# Patient Record
Sex: FEMALE | Race: WHITE | NOT HISPANIC OR LATINO | Employment: UNEMPLOYED | URBAN - METROPOLITAN AREA
[De-identification: names, ages, dates, MRNs, and addresses within clinical notes are randomized per-mention and may not be internally consistent; named-entity substitution may affect disease eponyms.]

---

## 2021-09-15 ENCOUNTER — OFFICE VISIT (OUTPATIENT)
Dept: INTERNAL MEDICINE | Facility: CLINIC | Age: 26
End: 2021-09-15
Payer: COMMERCIAL

## 2021-09-15 VITALS
DIASTOLIC BLOOD PRESSURE: 65 MMHG | HEIGHT: 66 IN | HEART RATE: 94 BPM | SYSTOLIC BLOOD PRESSURE: 101 MMHG | TEMPERATURE: 98 F | OXYGEN SATURATION: 99 % | BODY MASS INDEX: 18.33 KG/M2 | WEIGHT: 114.06 LBS

## 2021-09-15 DIAGNOSIS — F41.9 ANXIETY: Primary | ICD-10-CM

## 2021-09-15 PROCEDURE — 99999 PR PBB SHADOW E&M-NEW PATIENT-LVL III: ICD-10-PCS | Mod: PBBFAC,,, | Performed by: FAMILY MEDICINE

## 2021-09-15 PROCEDURE — 99203 PR OFFICE/OUTPT VISIT, NEW, LEVL III, 30-44 MIN: ICD-10-PCS | Mod: S$GLB,,, | Performed by: FAMILY MEDICINE

## 2021-09-15 PROCEDURE — 99203 OFFICE O/P NEW LOW 30 MIN: CPT | Mod: S$GLB,,, | Performed by: FAMILY MEDICINE

## 2021-09-15 PROCEDURE — 99999 PR PBB SHADOW E&M-NEW PATIENT-LVL III: CPT | Mod: PBBFAC,,, | Performed by: FAMILY MEDICINE

## 2021-09-15 RX ORDER — SPIRONOLACTONE 100 MG/1
100 TABLET, FILM COATED ORAL DAILY
COMMUNITY
End: 2023-08-29

## 2021-09-15 RX ORDER — CLINDAMYCIN PHOSPHATE AND TRETINOIN GEL 1.2%/0.025% 10; .25 MG/G; MG/G
GEL TOPICAL
COMMUNITY
Start: 2013-07-19 | End: 2022-09-29

## 2021-09-15 RX ORDER — LORAZEPAM 0.5 MG/1
0.5 TABLET ORAL EVERY 6 HOURS PRN
Qty: 21 TABLET | Refills: 0 | Status: SHIPPED | OUTPATIENT
Start: 2021-09-15 | End: 2021-10-06 | Stop reason: SDUPTHER

## 2021-09-15 RX ORDER — SULFACETAMIDE SODIUM AND SULFUR 9; 4.5 MG/G; MG/G
RINSE TOPICAL
COMMUNITY
Start: 2013-07-11 | End: 2023-08-29

## 2021-09-15 RX ORDER — ADAPALENE AND BENZOYL PEROXIDE GEL, 0.1%/2.5% 1; 25 MG/G; MG/G
GEL TOPICAL
COMMUNITY
Start: 2013-07-11 | End: 2022-09-29

## 2021-09-15 RX ORDER — ESCITALOPRAM OXALATE 10 MG/1
10 TABLET ORAL DAILY
COMMUNITY
End: 2021-10-06

## 2021-09-15 RX ORDER — METRONIDAZOLE 7.5 MG/G
GEL TOPICAL 2 TIMES DAILY
COMMUNITY
End: 2022-09-29

## 2021-09-16 ENCOUNTER — TELEPHONE (OUTPATIENT)
Dept: INTERNAL MEDICINE | Facility: CLINIC | Age: 26
End: 2021-09-16

## 2021-09-16 DIAGNOSIS — F41.9 ANXIETY: Primary | ICD-10-CM

## 2021-10-01 ENCOUNTER — PATIENT OUTREACH (OUTPATIENT)
Dept: ADMINISTRATIVE | Facility: HOSPITAL | Age: 26
End: 2021-10-01

## 2021-10-05 PROBLEM — F41.9 ANXIETY: Status: ACTIVE | Noted: 2021-10-05

## 2021-10-06 ENCOUNTER — OFFICE VISIT (OUTPATIENT)
Dept: INTERNAL MEDICINE | Facility: CLINIC | Age: 26
End: 2021-10-06
Payer: COMMERCIAL

## 2021-10-06 VITALS
TEMPERATURE: 96 F | HEIGHT: 66 IN | SYSTOLIC BLOOD PRESSURE: 102 MMHG | HEART RATE: 68 BPM | BODY MASS INDEX: 18.25 KG/M2 | DIASTOLIC BLOOD PRESSURE: 64 MMHG | OXYGEN SATURATION: 99 % | WEIGHT: 113.56 LBS

## 2021-10-06 DIAGNOSIS — F41.9 ANXIETY: ICD-10-CM

## 2021-10-06 PROCEDURE — 99213 PR OFFICE/OUTPT VISIT, EST, LEVL III, 20-29 MIN: ICD-10-PCS | Mod: S$GLB,,, | Performed by: FAMILY MEDICINE

## 2021-10-06 PROCEDURE — 99213 OFFICE O/P EST LOW 20 MIN: CPT | Mod: S$GLB,,, | Performed by: FAMILY MEDICINE

## 2021-10-06 PROCEDURE — 99999 PR PBB SHADOW E&M-EST. PATIENT-LVL III: CPT | Mod: PBBFAC,,, | Performed by: FAMILY MEDICINE

## 2021-10-06 PROCEDURE — 99999 PR PBB SHADOW E&M-EST. PATIENT-LVL III: ICD-10-PCS | Mod: PBBFAC,,, | Performed by: FAMILY MEDICINE

## 2021-10-06 RX ORDER — LORAZEPAM 0.5 MG/1
0.5 TABLET ORAL EVERY 6 HOURS PRN
Qty: 30 TABLET | Refills: 0 | Status: SHIPPED | OUTPATIENT
Start: 2021-10-06 | End: 2021-11-23 | Stop reason: SDUPTHER

## 2021-10-19 ENCOUNTER — TELEPHONE (OUTPATIENT)
Dept: INTERNAL MEDICINE | Facility: CLINIC | Age: 26
End: 2021-10-19

## 2021-11-23 RX ORDER — LORAZEPAM 0.5 MG/1
0.5 TABLET ORAL EVERY 6 HOURS PRN
Qty: 30 TABLET | Refills: 0 | Status: SHIPPED | OUTPATIENT
Start: 2021-11-23 | End: 2021-12-16 | Stop reason: SDUPTHER

## 2021-12-16 ENCOUNTER — OFFICE VISIT (OUTPATIENT)
Dept: INTERNAL MEDICINE | Facility: CLINIC | Age: 26
End: 2021-12-16
Payer: COMMERCIAL

## 2021-12-16 VITALS
WEIGHT: 117.06 LBS | DIASTOLIC BLOOD PRESSURE: 68 MMHG | HEIGHT: 66 IN | TEMPERATURE: 97 F | HEART RATE: 81 BPM | OXYGEN SATURATION: 97 % | BODY MASS INDEX: 18.81 KG/M2 | SYSTOLIC BLOOD PRESSURE: 100 MMHG

## 2021-12-16 DIAGNOSIS — F41.9 ANXIETY: Primary | ICD-10-CM

## 2021-12-16 PROCEDURE — 99999 PR PBB SHADOW E&M-EST. PATIENT-LVL III: CPT | Mod: PBBFAC,,, | Performed by: FAMILY MEDICINE

## 2021-12-16 PROCEDURE — 99999 PR PBB SHADOW E&M-EST. PATIENT-LVL III: ICD-10-PCS | Mod: PBBFAC,,, | Performed by: FAMILY MEDICINE

## 2021-12-16 PROCEDURE — 99213 OFFICE O/P EST LOW 20 MIN: CPT | Mod: S$GLB,,, | Performed by: FAMILY MEDICINE

## 2021-12-16 PROCEDURE — 99213 PR OFFICE/OUTPT VISIT, EST, LEVL III, 20-29 MIN: ICD-10-PCS | Mod: S$GLB,,, | Performed by: FAMILY MEDICINE

## 2021-12-16 RX ORDER — LORAZEPAM 0.5 MG/1
0.5 TABLET ORAL EVERY 6 HOURS PRN
Qty: 30 TABLET | Refills: 0 | Status: SHIPPED | OUTPATIENT
Start: 2021-12-16 | End: 2022-01-10 | Stop reason: SDUPTHER

## 2021-12-16 RX ORDER — VENLAFAXINE HYDROCHLORIDE 37.5 MG/1
37.5 CAPSULE, EXTENDED RELEASE ORAL DAILY
Qty: 30 CAPSULE | Refills: 11 | Status: SHIPPED | OUTPATIENT
Start: 2021-12-16 | End: 2022-02-02

## 2022-01-10 ENCOUNTER — PATIENT MESSAGE (OUTPATIENT)
Dept: INTERNAL MEDICINE | Facility: CLINIC | Age: 27
End: 2022-01-10
Payer: COMMERCIAL

## 2022-01-10 RX ORDER — LORAZEPAM 0.5 MG/1
0.5 TABLET ORAL EVERY 6 HOURS PRN
Qty: 30 TABLET | Refills: 0 | Status: SHIPPED | OUTPATIENT
Start: 2022-01-10 | End: 2022-02-09 | Stop reason: SDUPTHER

## 2022-01-10 NOTE — TELEPHONE ENCOUNTER
Care Due:                  Date            Visit Type   Department     Provider  --------------------------------------------------------------------------------                                             [facility]  Last Visit: 12-      [facility]       [facility]                              FOLLOWUP/OF  [facility]  Next Visit: 02-      [facility] VISIT   [facility]       Amanda Cruz                                                            Last  Test          Frequency    Reason                     Performed    Due Date  --------------------------------------------------------------------------------    Cr..........  12 months..  venlafaxine..............  Not Found    Overdue    Powered by [employer]. Reference number: [id number].   1/10/2022 12:05:16 PM CST

## 2022-01-10 NOTE — TELEPHONE ENCOUNTER
----- Message from Yordan Irving sent at 1/10/2022 11:59 AM CST -----  Contact: self  [phone]  Requesting an RX refill or new RX.  Is this a refill or new RX: refill  RX name and strength (copy/paste from chart): LORazepam (ATIVAN) 0.5 MG tablet  Is this a 30 day or 90 day RX:   Patient advised that in the future they can use their [facility] account to request a refill?:  self  Pharmacy name and phone # (copy/paste from chart):    [id number] - [place] - [address] [facility]  [address] [address]  Phone: [phone] Fax: [phone]      Comments:     Please call and advise

## 2022-01-12 ENCOUNTER — PATIENT MESSAGE (OUTPATIENT)
Dept: INTERNAL MEDICINE | Facility: CLINIC | Age: 27
End: 2022-01-12
Payer: COMMERCIAL

## 2022-01-13 ENCOUNTER — PATIENT MESSAGE (OUTPATIENT)
Dept: INTERNAL MEDICINE | Facility: CLINIC | Age: 27
End: 2022-01-13
Payer: COMMERCIAL

## 2022-01-19 ENCOUNTER — PATIENT OUTREACH (OUTPATIENT)
Dept: ADMINISTRATIVE | Facility: OTHER | Age: 27
End: 2022-01-19
Payer: COMMERCIAL

## 2022-01-20 ENCOUNTER — OFFICE VISIT (OUTPATIENT)
Dept: OTOLARYNGOLOGY | Facility: CLINIC | Age: 27
End: 2022-01-20
Payer: COMMERCIAL

## 2022-01-20 DIAGNOSIS — H93.8X1 EAR FULLNESS, RIGHT: Primary | ICD-10-CM

## 2022-01-20 PROCEDURE — 99203 PR OFFICE/OUTPT VISIT, NEW, LEVL III, 30-44 MIN: ICD-10-PCS | Mod: 95,,, | Performed by: PHYSICIAN ASSISTANT

## 2022-01-20 PROCEDURE — 99203 OFFICE O/P NEW LOW 30 MIN: CPT | Mod: 95,,, | Performed by: PHYSICIAN ASSISTANT

## 2022-01-20 NOTE — PROGRESS NOTES
Health Maintenance Due   Topic Date Due    Hepatitis C Screening  Never done    Lipid Panel  Never done    HPV Vaccines (1 - 2-dose series) Never done    HIV Screening  Never done    TETANUS VACCINE  Never done    Influenza Vaccine (1) Never done    COVID-19 Vaccine (3 - Booster for Pfizer series) 02/07/2022     Updates were requested from care everywhere.  Chart was reviewed for overdue [facility]active [facility] Encounters ([facility]ER[facility]) topics (CRS, Breast Cancer Screening, Eye exam)  Health Maintenance has been updated.  LINKS immunization registry triggered.  Immunizations were reconciled.

## 2022-01-20 NOTE — PROGRESS NOTES
"Subjective:   Patient ID: Lisa Patterson is a 26 y.o. female.    Chief Complaint: No chief complaint on file.    The patient location is: [place]  The chief complaint leading to consultation is:     Visit type: audiovisual    Face to Face time with patient: 25 minutes of total time spent on the encounter, which includes face to face time and non-face to face time preparing to see the patient (eg, review of tests), Obtaining and/or reviewing separately obtained history, Documenting clinical information in the electronic or other health record, Independently interpreting results (not separately reported) and communicating results to the patient/family/caregiver, or Care coordination (not separately reported).         Each patient to whom he or she provides medical services by telemedicine is:  (1) informed of the relationship between the physician and patient and the respective role of any other health care provider with respect to management of the patient; and (2) notified that he or she may decline to receive medical services by telemedicine and may withdraw from such care at any time.    Notes: 27 yo female I spoke to via telemed with complaints of right ear muffled hearing, "feels like water in my ear" She denies any drainage, fever, hearing loss or pain. She recently had Covid.     Review of patient's allergies indicates:  No Known Allergies        Review of Systems   Constitutional: Positive for fever.   HENT: Positive for ear pain.    Eyes: Positive for photophobia.   Respiratory: Positive for cough.    Cardiovascular: Negative.    Gastrointestinal: Negative.    Endocrine: Negative.    Genitourinary: Negative.    Musculoskeletal: Negative.    Skin: Negative.    Allergic/Immunologic: Negative.    Neurological: Positive for dizziness and headaches.   Hematological: Negative.    Psychiatric/Behavioral: Positive for decreased concentration. The patient is nervous/anxious.          Objective:   There were no vitals " taken for this visit.    Physical Exam  Constitutional:       Appearance: Normal appearance.   HENT:      Head: Normocephalic and atraumatic.   Eyes:      Extraocular Movements: Extraocular movements intact.      Pupils: Pupils are equal, round, and reactive to light.   Neurological:      Mental Status: She is alert.        LIMITED DUE TO TELEMEDICINE      Assessment:     1. Ear fullness, right        Plan:     Ear fullness, right      Discussed that is it hard to diagnose withour seeing the ear. Dicussed ETD vs effusion.     We had a long discussion regarding the anatomy and function of the eustachian tube.  We discussed that the eustachian tube acts as a pump to keep the appropriate amount of pressure behind the ear drum.  I gave the patient a prescription for a nasal steroid spray to be used on a daily basis, and we discussed that it will take 2-3 weeks of daily use to achieve maximal effectiveness.    If discomfort does not improve, she will make an appt to be seen in person.

## 2022-01-25 ENCOUNTER — PATIENT MESSAGE (OUTPATIENT)
Dept: INTERNAL MEDICINE | Facility: CLINIC | Age: 27
End: 2022-01-25
Payer: COMMERCIAL

## 2022-02-01 ENCOUNTER — OFFICE VISIT (OUTPATIENT)
Dept: OTOLARYNGOLOGY | Facility: CLINIC | Age: 27
End: 2022-02-01
Payer: COMMERCIAL

## 2022-02-01 VITALS
BODY MASS INDEX: 19.78 KG/M2 | DIASTOLIC BLOOD PRESSURE: 69 MMHG | HEART RATE: 99 BPM | SYSTOLIC BLOOD PRESSURE: 100 MMHG | TEMPERATURE: 98 F | WEIGHT: 122.56 LBS

## 2022-02-01 DIAGNOSIS — H65.91 OME (OTITIS MEDIA WITH EFFUSION), RIGHT: Primary | ICD-10-CM

## 2022-02-01 DIAGNOSIS — H60.311 ACUTE DIFFUSE OTITIS EXTERNA OF RIGHT EAR: ICD-10-CM

## 2022-02-01 PROCEDURE — 99214 PR OFFICE/OUTPT VISIT, EST, LEVL IV, 30-39 MIN: ICD-10-PCS | Mod: S$GLB,,, | Performed by: PHYSICIAN ASSISTANT

## 2022-02-01 PROCEDURE — 99999 PR PBB SHADOW E&M-EST. PATIENT-LVL III: CPT | Mod: PBBFAC,,, | Performed by: PHYSICIAN ASSISTANT

## 2022-02-01 PROCEDURE — 99999 PR PBB SHADOW E&M-EST. PATIENT-LVL III: ICD-10-PCS | Mod: PBBFAC,,, | Performed by: PHYSICIAN ASSISTANT

## 2022-02-01 PROCEDURE — 99214 OFFICE O/P EST MOD 30 MIN: CPT | Mod: S$GLB,,, | Performed by: PHYSICIAN ASSISTANT

## 2022-02-01 RX ORDER — AMOXICILLIN 875 MG/1
875 TABLET, FILM COATED ORAL 2 TIMES DAILY
Qty: 20 TABLET | Refills: 0 | Status: SHIPPED | OUTPATIENT
Start: 2022-02-01 | End: 2022-02-11

## 2022-02-01 RX ORDER — CIPROFLOXACIN AND DEXAMETHASONE 3; 1 MG/ML; MG/ML
4 SUSPENSION/ DROPS AURICULAR (OTIC) 2 TIMES DAILY
Qty: 7.5 ML | Refills: 0 | Status: SHIPPED | OUTPATIENT
Start: 2022-02-01 | End: 2022-02-11

## 2022-02-01 NOTE — PROGRESS NOTES
"Subjective:   Patient ID: Lisa Patterson is a 26 y.o. female.    Chief Complaint: No chief complaint on file.    Ms. Patterson is a very pleasant 27 yo female here to see me today for complaints of right ear pain and "hearing under water." I saw her virtually a few months ago with ETD and she has been suing Flonase regularly. She was initially doing better but this past week worsened. Denies any fever.     Review of patient's allergies indicates:  No Known Allergies        Review of Systems   Constitutional: Positive for appetite change and fever.   HENT: Positive for ear pain and hearing loss.    Eyes: Positive for photophobia.   Respiratory: Negative.    Cardiovascular: Negative.    Gastrointestinal: Negative.    Endocrine: Negative.    Genitourinary: Negative.    Musculoskeletal: Negative.    Skin: Negative.    Neurological: Positive for dizziness and headaches.   Hematological: Negative.    Psychiatric/Behavioral: Positive for decreased concentration.         Objective:   /69   Pulse 99   Temp 97.7 °F (36.5 °C) (Temporal)   Wt 55.6 kg (122 lb 9.2 oz)   BMI 19.78 kg/m²     Physical Exam  Constitutional:       General: She is not in acute distress.     Appearance: She is well-developed and well-nourished.   HENT:      Head: Normocephalic and atraumatic.      Right Ear: Ear canal and external ear normal. Swelling and tenderness present. A middle ear effusion (pus) is present.      Left Ear: Tympanic membrane, ear canal and external ear normal.      Nose: Nose normal. No nasal deformity, septal deviation, mucosal edema, rhinorrhea or epistaxis.      Right Sinus: No maxillary sinus tenderness or frontal sinus tenderness.      Left Sinus: No maxillary sinus tenderness or frontal sinus tenderness.      Mouth/Throat:      Mouth: Oropharynx is clear and moist and mucous membranes are normal. Mucous membranes are not pale and not dry.      Dentition: No dental caries.      Pharynx: Uvula midline. No oropharyngeal " exudate or posterior oropharyngeal erythema.   Eyes:      General: Lids are normal. No scleral icterus.     Extraocular Movements: EOM normal.      Right eye: Normal extraocular motion and no nystagmus.      Left eye: Normal extraocular motion and no nystagmus.      Conjunctiva/sclera: Conjunctivae normal.      Right eye: Right conjunctiva is not injected. No chemosis.     Left eye: Left conjunctiva is not injected. No chemosis.     Pupils: Pupils are equal, round, and reactive to light.   Neck:      Thyroid: No thyroid mass or thyromegaly.      Trachea: Trachea and phonation normal. No tracheal tenderness or tracheal deviation.   Cardiovascular:      Pulses: Intact distal pulses.   Pulmonary:      Effort: Pulmonary effort is normal. No respiratory distress.      Breath sounds: No stridor.   Abdominal:      General: There is no distension.   Lymphadenopathy:      Head:      Right side of head: No submental, submandibular, preauricular, posterior auricular or occipital adenopathy.      Left side of head: No submental, submandibular, preauricular, posterior auricular or occipital adenopathy.      Cervical: No cervical adenopathy.   Skin:     General: Skin is warm and dry.      Findings: No erythema or rash.   Neurological:      Mental Status: She is alert and oriented to person, place, and time.      Cranial Nerves: No cranial nerve deficit.   Psychiatric:         Mood and Affect: Mood and affect normal.         Behavior: Behavior normal.              Assessment:     1. OME (otitis media with effusion), right    2. Acute diffuse otitis externa of right ear        Plan:     OME (otitis media with effusion), right    Acute diffuse otitis externa of right ear    Other orders  -     amoxicillin (AMOXIL) 875 MG tablet; Take 1 tablet (875 mg total) by mouth 2 (two) times daily. for 10 days  Dispense: 20 tablet; Refill: 0  -     ciprofloxacin-dexamethasone 0.3-0.1% (CIPRODEX) 0.3-0.1 % DrpS; Place 4 drops into both ears 2  (two) times daily. for 10 days  Dispense: 7.5 mL; Refill: 0      I have started her on both oral antibiotic as well as topical. I will see her back in 2 weeks for recheck or sooner if needed. She will continue her Flonase twice a day.

## 2022-02-02 ENCOUNTER — OFFICE VISIT (OUTPATIENT)
Dept: INTERNAL MEDICINE | Facility: CLINIC | Age: 27
End: 2022-02-02
Payer: COMMERCIAL

## 2022-02-02 VITALS
DIASTOLIC BLOOD PRESSURE: 68 MMHG | WEIGHT: 119.94 LBS | HEIGHT: 66 IN | BODY MASS INDEX: 19.27 KG/M2 | OXYGEN SATURATION: 97 % | TEMPERATURE: 97 F | SYSTOLIC BLOOD PRESSURE: 108 MMHG | HEART RATE: 106 BPM | RESPIRATION RATE: 18 BRPM

## 2022-02-02 DIAGNOSIS — F41.9 ANXIETY: Primary | ICD-10-CM

## 2022-02-02 PROCEDURE — 99999 PR PBB SHADOW E&M-EST. PATIENT-LVL III: CPT | Mod: PBBFAC,,, | Performed by: FAMILY MEDICINE

## 2022-02-02 PROCEDURE — 99213 PR OFFICE/OUTPT VISIT, EST, LEVL III, 20-29 MIN: ICD-10-PCS | Mod: S$GLB,,, | Performed by: FAMILY MEDICINE

## 2022-02-02 PROCEDURE — 99999 PR PBB SHADOW E&M-EST. PATIENT-LVL III: ICD-10-PCS | Mod: PBBFAC,,, | Performed by: FAMILY MEDICINE

## 2022-02-02 PROCEDURE — 99213 OFFICE O/P EST LOW 20 MIN: CPT | Mod: S$GLB,,, | Performed by: FAMILY MEDICINE

## 2022-02-02 NOTE — PROGRESS NOTES
Lisa Patterson  02/02/2022  [id number]    Amanda Cruz MD  Patient Care Team:  Amanda Cruz MD as PCP - General (Family Medicine)  Jo Michelle MD as Obstetrician (Obstetrics and Gynecology)  Has the patient seen any provider outside of the [facility] network since the last visit? (no). If yes, HIPPA forms completed and records requested.        Visit Type:a scheduled routine follow-up visit    Chief Complaint:  Chief Complaint   Patient presents with    Follow-up       History of Present Illness:  26 year old here for follow up.    Anxiety  Did not tolerate the Effexor  Felt dizziness.    She has been getting frustrated with medication.    She wants to solve when she is feeling super sad.    She is taking Ativan now once a day.  She reports she is still feeling depression.    She is seeing therapist.     She has had issues with taking Lexapro and the Effexor.               History:  Past Medical History:   Diagnosis Date    Anorexia     Anxiety      No past surgical history on file.  Family History   Problem Relation Age of Onset    Thyroid disease Mother     Breast cancer Maternal Grandfather      Social History     Socioeconomic History    Marital status: Single   Tobacco Use    Smoking status: Never Smoker    Smokeless tobacco: Never Used   Substance and Sexual Activity    Alcohol use: Not Currently    Drug use: Never    Sexual activity: Not Currently     Patient Active Problem List   Diagnosis    Anxiety     Review of patient's allergies indicates:  No Known Allergies    The following were reviewed at this visit: active problem list, medication list, allergies, family history, social history, and health maintenance.    Medications:  Current Outpatient Medications on File Prior to Visit   Medication Sig Dispense Refill    amoxicillin (AMOXIL) 875 MG tablet Take 1 tablet (875 mg total) by mouth 2 (two) times daily. for 10 days 20 tablet 0    ciprofloxacin-dexamethasone 0.3-0.1% (CIPRODEX)  0.3-0.1 % DrpS Place 4 drops into both ears 2 (two) times daily. for 10 days 7.5 mL 0    LORazepam (ATIVAN) 0.5 MG tablet Take 1 tablet (0.5 mg total) by mouth every 6 (six) hours as needed for Anxiety. 30 tablet 0    metroNIDAZOLE (METROGEL) 0.75 % gel Apply topically 2 (two) times daily.      spironolactone (ALDACTONE) 100 MG tablet Take 100 mg by mouth once daily.      sulfacetamide sodium-sulfur 9-4.5 % Clsr       adapalene-benzoyl peroxide (EPIDUO) 0.1-2.5 % GlwP       clindamycin-tretinoin (ZIANA) gel       [DISCONTINUED] venlafaxine (EFFEXOR-XR) 37.5 MG 24 hr capsule Take 1 capsule (37.5 mg total) by mouth once daily. (Patient not taking: Reported on 2/2/2022) 30 capsule 11     No current facility-administered medications on file prior to visit.       Medications have been reviewed and reconciled with patient at this visit.  Barriers to medications reviewed with patient.    Adverse reactions to current medications reviewed with patient..    Over the counter medications reviewed and reconciled with patient.    Exam:  Wt Readings from Last 3 Encounters:   02/02/22 54.4 kg (119 lb 14.9 oz)   02/01/22 55.6 kg (122 lb 9.2 oz)   12/16/21 53.1 kg (117 lb 1 oz)     Temp Readings from Last 3 Encounters:   02/02/22 97.2 °F (36.2 °C) (Tympanic)   02/01/22 97.7 °F (36.5 °C) (Temporal)   12/16/21 97 °F (36.1 °C) (Tympanic)     BP Readings from Last 3 Encounters:   02/02/22 108/68   02/01/22 100/69   12/16/21 100/68     Pulse Readings from Last 3 Encounters:   02/02/22 106   02/01/22 99   12/16/21 81     Body mass index is 19.36 kg/m².      Review of Systems   HENT: Positive for hearing loss.    Eyes: Negative for discharge.   Respiratory: Negative for wheezing.    Cardiovascular: Negative for chest pain and palpitations.   Gastrointestinal: Negative for blood in stool, constipation, diarrhea and vomiting.   Genitourinary: Negative for dysuria and hematuria.   Musculoskeletal: Positive for neck pain.   Neurological:  Positive for headaches.   Endo/Heme/Allergies: Negative for polydipsia.   Psychiatric/Behavioral: The patient is nervous/anxious.    Answers for HPI/ROS submitted by the patient on 2/2/2022  activity change: Yes  unexpected weight change: No  rhinorrhea: No  trouble swallowing: No  visual disturbance: No  chest tightness: No  polyuria: No  difficulty urinating: No  menstrual problem: No  joint swelling: No  arthralgias: No  confusion: Yes  dysphoric mood: Yes      Physical Exam  Vitals and nursing note reviewed.   Constitutional:       General: She is not in acute distress.     Appearance: She is well-developed. She is not diaphoretic.   HENT:      Head: Normocephalic and atraumatic.      Right Ear: External ear normal.      Left Ear: External ear normal.      Nose: Nose normal.   Eyes:      General:         Right eye: No discharge.         Left eye: No discharge.      Conjunctiva/sclera: Conjunctivae normal.      Pupils: Pupils are equal, round, and reactive to light.   Neck:      Thyroid: No thyromegaly.   Cardiovascular:      Rate and Rhythm: Normal rate and regular rhythm.      Heart sounds: Normal heart sounds. No murmur heard.      Pulmonary:      Effort: Pulmonary effort is normal. No respiratory distress.      Breath sounds: Normal breath sounds. No wheezing.   Musculoskeletal:         General: Normal range of motion.      Cervical back: Normal range of motion and neck supple.   Lymphadenopathy:      Cervical: No cervical adenopathy.   Skin:     Capillary Refill: Capillary refill takes less than 2 seconds.   Neurological:      Mental Status: She is alert and oriented to person, place, and time.      Cranial Nerves: No cranial nerve deficit.   Psychiatric:         Behavior: Behavior normal.         Thought Content: Thought content normal.         Judgment: Judgment normal.         Laboratory Reviewed ({N/A)  No results found for: WBC, HGB, HCT, PLT, CHOL, TRIG, HDL, LDLDIRECT, ALT, AST, NA, K, CL, CREATININE,  BUN, CO2, TSH, PSA, INR, GLUF, HGBA1C, MICROALBUR    Lisa was seen today for follow-up.    Diagnoses and all orders for this visit:    Anxiety  -     Ambulatory referral/consult to Psychiatry; Future      She is going to see Psych  She is going to continue prn ativan    Needs to discuss mood stablizer.   She will continue with therapy.    She also will discuss her focus.            Care Plan/Goals: Reviewed   Goals    None         Follow up: No follow-ups on file.    After visit summary was printed and given to patient upon discharge today.  Patient goals and care plan are included in After Visit Summary.

## 2022-02-09 ENCOUNTER — PATIENT MESSAGE (OUTPATIENT)
Dept: INTERNAL MEDICINE | Facility: CLINIC | Age: 27
End: 2022-02-09
Payer: COMMERCIAL

## 2022-02-09 RX ORDER — LORAZEPAM 0.5 MG/1
0.5 TABLET ORAL DAILY PRN
Qty: 30 TABLET | Refills: 0 | Status: SHIPPED | OUTPATIENT
Start: 2022-02-09 | End: 2022-03-11 | Stop reason: SDUPTHER

## 2022-02-16 ENCOUNTER — TELEPHONE (OUTPATIENT)
Dept: OTOLARYNGOLOGY | Facility: CLINIC | Age: 27
End: 2022-02-16
Payer: COMMERCIAL

## 2022-02-18 ENCOUNTER — TELEPHONE (OUTPATIENT)
Dept: OTOLARYNGOLOGY | Facility: CLINIC | Age: 27
End: 2022-02-18
Payer: COMMERCIAL

## 2022-02-21 ENCOUNTER — PATIENT OUTREACH (OUTPATIENT)
Dept: ADMINISTRATIVE | Facility: OTHER | Age: 27
End: 2022-02-21
Payer: COMMERCIAL

## 2022-02-22 NOTE — PROGRESS NOTES
Health Maintenance Due   Topic Date Due    Hepatitis C Screening  Never done    Lipid Panel  Never done    HPV Vaccines (1 - 2-dose series) Never done    HIV Screening  Never done    TETANUS VACCINE  Never done    Influenza Vaccine (1) Never done    COVID-19 Vaccine (3 - Booster for Pfizer series) 01/07/2022     Updates were requested from care everywhere.  Chart was reviewed for overdue [facility] Encounters ([facility]) topics (CRS, Breast Cancer Screening, Eye exam)  Health Maintenance has been updated.  LINKS immunization registry triggered.  Immunizations were reconciled.

## 2022-03-09 ENCOUNTER — TELEPHONE (OUTPATIENT)
Dept: PSYCHIATRY | Facility: CLINIC | Age: 27
End: 2022-03-09
Payer: COMMERCIAL

## 2022-03-10 ENCOUNTER — PATIENT MESSAGE (OUTPATIENT)
Dept: INTERNAL MEDICINE | Facility: CLINIC | Age: 27
End: 2022-03-10
Payer: COMMERCIAL
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
## 2022-03-11 ENCOUNTER — OFFICE VISIT (OUTPATIENT)
Dept: PSYCHIATRY | Facility: CLINIC | Age: 27
End: 2022-03-11
Payer: COMMERCIAL

## 2022-03-11 DIAGNOSIS — F41.1 GAD (GENERALIZED ANXIETY DISORDER): ICD-10-CM

## 2022-03-11 PROCEDURE — 90792 PSYCH DIAG EVAL W/MED SRVCS: CPT | Mod: S$GLB,,, | Performed by: PSYCHIATRY & NEUROLOGY

## 2022-03-11 PROCEDURE — 99999 PR PBB SHADOW E&M-EST. PATIENT-LVL II: ICD-10-PCS | Mod: PBBFAC,,, | Performed by: PSYCHIATRY & NEUROLOGY

## 2022-03-11 PROCEDURE — 99999 PR PBB SHADOW E&M-EST. PATIENT-LVL II: CPT | Mod: PBBFAC,,, | Performed by: PSYCHIATRY & NEUROLOGY

## 2022-03-11 PROCEDURE — 90792 PR PSYCHIATRIC DIAGNOSTIC EVALUATION W/MEDICAL SERVICES: ICD-10-PCS | Mod: S$GLB,,, | Performed by: PSYCHIATRY & NEUROLOGY

## 2022-03-11 RX ORDER — LORAZEPAM 0.5 MG/1
0.5 TABLET ORAL DAILY PRN
Qty: 30 TABLET | Refills: 0 | Status: SHIPPED | OUTPATIENT
Start: 2022-03-11 | End: 2022-03-11 | Stop reason: SDUPTHER

## 2022-03-11 RX ORDER — SERTRALINE HYDROCHLORIDE 100 MG/1
TABLET, FILM COATED ORAL
Qty: 30 TABLET | Refills: 2 | Status: SHIPPED | OUTPATIENT
Start: 2022-03-11 | End: 2022-04-22

## 2022-03-11 RX ORDER — LORAZEPAM 0.5 MG/1
0.5 TABLET ORAL DAILY PRN
Qty: 30 TABLET | Refills: 2 | Status: SHIPPED | OUTPATIENT
Start: 2022-03-11 | End: 2022-04-22 | Stop reason: SDUPTHER

## 2022-03-11 NOTE — TELEPHONE ENCOUNTER
No new care gaps identified.  Powered by [employer]. Reference number: [id number].   3/11/2022 7:52:13 AM CST

## 2022-03-25 ENCOUNTER — PATIENT OUTREACH (OUTPATIENT)
Dept: ADMINISTRATIVE | Facility: OTHER | Age: 27
End: 2022-03-25
Payer: COMMERCIAL

## 2022-03-28 ENCOUNTER — OFFICE VISIT (OUTPATIENT)
Dept: OBSTETRICS AND GYNECOLOGY | Facility: CLINIC | Age: 27
End: 2022-03-28
Payer: COMMERCIAL

## 2022-03-28 VITALS
SYSTOLIC BLOOD PRESSURE: 120 MMHG | WEIGHT: 117.31 LBS | BODY MASS INDEX: 18.93 KG/M2 | DIASTOLIC BLOOD PRESSURE: 68 MMHG

## 2022-03-28 DIAGNOSIS — Z12.4 ENCOUNTER FOR SCREENING FOR CERVICAL CANCER: ICD-10-CM

## 2022-03-28 DIAGNOSIS — Z30.09 ENCOUNTER FOR COUNSELING REGARDING CONTRACEPTION: ICD-10-CM

## 2022-03-28 DIAGNOSIS — Z01.419 WELL WOMAN EXAM WITH ROUTINE GYNECOLOGICAL EXAM: Primary | ICD-10-CM

## 2022-03-28 PROCEDURE — 99385 PREV VISIT NEW AGE 18-39: CPT | Mod: S$GLB,,, | Performed by: NURSE PRACTITIONER

## 2022-03-28 PROCEDURE — 99999 PR PBB SHADOW E&M-EST. PATIENT-LVL III: ICD-10-PCS | Mod: PBBFAC,,, | Performed by: NURSE PRACTITIONER

## 2022-03-28 PROCEDURE — 88175 CYTOPATH C/V AUTO FLUID REDO: CPT | Performed by: NURSE PRACTITIONER

## 2022-03-28 PROCEDURE — 99999 PR PBB SHADOW E&M-EST. PATIENT-LVL III: CPT | Mod: PBBFAC,,, | Performed by: NURSE PRACTITIONER

## 2022-03-28 PROCEDURE — 99385 PR PREVENTIVE VISIT,NEW,18-39: ICD-10-PCS | Mod: S$GLB,,, | Performed by: NURSE PRACTITIONER

## 2022-03-28 RX ORDER — METRONIDAZOLE 7.5 MG/G
LOTION TOPICAL 2 TIMES DAILY
COMMUNITY
Start: 2022-02-04

## 2022-03-28 RX ORDER — DOXYCYCLINE 100 MG/1
100 CAPSULE ORAL DAILY
COMMUNITY
Start: 2022-03-22 | End: 2022-09-29

## 2022-03-28 NOTE — PROGRESS NOTES
Subjective:       Patient ID: Lisa Patterson is a 26 y.o. female.    Chief Complaint:  Well Woman and Contraception    Patient's last menstrual period was 2022.  History of Present Illness  Annual Exam-Premenopausal  Patient presents for annual exam. The patient has no complaints today. The patient is not currently sexually active. GYN screening history: last pap: approximate date 21 and was normal. The patient wears seatbelts: yes. The patient participates in regular exercise: yes. Has the patient ever been transfused or tattooed?: no. The patient reports that there is not domestic violence in her life.  Patient also requesting contraception counseling today.  Patient has previously tried multiple methods of birth control with adverse effects bleeding to discontinuation.  Patient continues to struggle with hormonal acne as well as abnormal facial hair growth.  Dermatology advised patient that she will need to be on birth control for other treatment options.    OB History    Para Term  AB Living   0 0 0 0 0 0   SAB IAB Ectopic Multiple Live Births   0 0 0 0 0       Review of Systems  Review of Systems   Constitutional: Negative for appetite change, fatigue, fever and unexpected weight change.   Eyes: Negative for visual disturbance.   Cardiovascular: Negative for chest pain.   Gastrointestinal: Negative for abdominal pain, bloating, constipation, diarrhea, nausea and vomiting.   Genitourinary: Negative for bladder incontinence, dysmenorrhea, dyspareunia, dysuria, flank pain, frequency, genital sores, menorrhagia, menstrual problem, pelvic pain, urgency, vaginal bleeding, vaginal discharge, vaginal pain, postcoital bleeding, vaginal dryness and vaginal odor.   Integumentary:  Positive for acne. Negative for rash, mole/lesion, breast mass, nipple discharge, breast skin changes and breast tenderness.        Hirsutism   Neurological: Negative for syncope and headaches.   Hematological: Negative  for adenopathy. Does not bruise/bleed easily.   All other systems reviewed and are negative.  Breast: Positive for breast self exam.Negative for asymmetry, lump, mass, nipple discharge, skin changes and tenderness           Objective:      Physical Exam:   Constitutional: She is oriented to person, place, and time. She appears well-developed and well-nourished.    HENT:   Head: Normocephalic and atraumatic.    Eyes: Pupils are equal, round, and reactive to light. Conjunctivae and EOM are normal.     Cardiovascular: Normal rate and regular rhythm.     Pulmonary/Chest: Effort normal. Right breast exhibits no inverted nipple, no mass, no nipple discharge, no skin change, no tenderness, no bleeding and no swelling. Left breast exhibits no inverted nipple, no mass, no nipple discharge, no skin change, no tenderness, no bleeding and no swelling. Breasts are symmetrical.        Abdominal: Soft. Hernia confirmed negative in the right inguinal area and confirmed negative in the left inguinal area.     Genitourinary:    Inguinal canal, vagina, uterus, right adnexa, left adnexa and rectum normal.      Pelvic exam was performed with patient supine.   The external female genitalia was normal.   Genitalia hair distrobution normal .   Labial bartholins normal.There is no rash, tenderness, lesion or injury on the right labia. There is no rash, tenderness, lesion or injury on the left labia. Cervix is normal. No erythema,  no vaginal discharge, bleeding, rectocele, cystocele or unspecified prolapse of vaginal walls in the vagina. Cervix exhibits no motion tenderness and no friability. Uterus is not tender.           Musculoskeletal: Normal range of motion and moves all extremeties.      Lymphadenopathy: No inguinal adenopathy noted on the right or left side.    Neurological: She is alert and oriented to person, place, and time.    Skin: Skin is warm and dry. No rash noted. No erythema.    Psychiatric: She has a normal mood and  affect. Her behavior is normal. Judgment and thought content normal.            Assessment:     1. Well woman exam with routine gynecological exam    2. Encounter for screening for cervical cancer            Plan:   Lisa was seen today for well woman and contraception.    Diagnoses and all orders for this visit:    Well woman exam with routine gynecological exam  -     Liquid-Based Pap Smear, Screening    Encounter for screening for cervical cancer  -     Liquid-Based Pap Smear, Screening      Discussed contraception options with patient including pills, patch, ring, Depo Provera, Nexplanon and IUDs. Patient desires to consider options and will let me know.     Follow up with me in 1 year for annual well woman exam.

## 2022-04-05 LAB
FINAL PATHOLOGIC DIAGNOSIS: NORMAL
Lab: NORMAL

## 2022-04-20 ENCOUNTER — TELEPHONE (OUTPATIENT)
Dept: PSYCHIATRY | Facility: CLINIC | Age: 27
End: 2022-04-20
Payer: COMMERCIAL

## 2022-04-22 ENCOUNTER — OFFICE VISIT (OUTPATIENT)
Dept: PSYCHIATRY | Facility: CLINIC | Age: 27
End: 2022-04-22
Payer: COMMERCIAL

## 2022-04-22 DIAGNOSIS — G47.00 INSOMNIA, UNSPECIFIED TYPE: ICD-10-CM

## 2022-04-22 DIAGNOSIS — F41.1 GAD (GENERALIZED ANXIETY DISORDER): Primary | ICD-10-CM

## 2022-04-22 PROCEDURE — 99214 OFFICE O/P EST MOD 30 MIN: CPT | Mod: 95,,, | Performed by: PSYCHIATRY & NEUROLOGY

## 2022-04-22 PROCEDURE — 99214 PR OFFICE/OUTPT VISIT, EST, LEVL IV, 30-39 MIN: ICD-10-PCS | Mod: 95,,, | Performed by: PSYCHIATRY & NEUROLOGY

## 2022-04-22 RX ORDER — SERTRALINE HYDROCHLORIDE 50 MG/1
75 TABLET, FILM COATED ORAL DAILY
Qty: 45 TABLET | Refills: 2 | Status: SHIPPED | OUTPATIENT
Start: 2022-04-22 | End: 2022-06-20 | Stop reason: SDUPTHER

## 2022-04-22 RX ORDER — LORAZEPAM 0.5 MG/1
0.5 TABLET ORAL DAILY PRN
Qty: 30 TABLET | Refills: 2 | Status: SHIPPED | OUTPATIENT
Start: 2022-04-22 | End: 2022-08-04
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
## 2022-05-09 ENCOUNTER — PATIENT OUTREACH (OUTPATIENT)
Dept: ADMINISTRATIVE | Facility: OTHER | Age: 27
End: 2022-05-09
Payer: COMMERCIAL

## 2022-05-09 ENCOUNTER — OFFICE VISIT (OUTPATIENT)
Dept: DERMATOLOGY | Facility: CLINIC | Age: 27
End: 2022-05-09
Payer: COMMERCIAL

## 2022-05-09 ENCOUNTER — PATIENT MESSAGE (OUTPATIENT)
Dept: DERMATOLOGY | Facility: CLINIC | Age: 27
End: 2022-05-09

## 2022-05-09 DIAGNOSIS — L70.0 ACNE VULGARIS: Primary | ICD-10-CM

## 2022-05-09 PROCEDURE — 99203 OFFICE O/P NEW LOW 30 MIN: CPT | Mod: 95,,, | Performed by: PHYSICIAN ASSISTANT

## 2022-05-09 PROCEDURE — 99203 PR OFFICE/OUTPT VISIT, NEW, LEVL III, 30-44 MIN: ICD-10-PCS | Mod: 95,,, | Performed by: PHYSICIAN ASSISTANT

## 2022-05-09 RX ORDER — AZELAIC ACID 0.15 G/G
AEROSOL, FOAM TOPICAL
Qty: 50 G | Refills: 5 | Status: SHIPPED | OUTPATIENT
Start: 2022-05-09 | End: 2023-08-29

## 2022-05-09 NOTE — PROGRESS NOTES
Subjective:       Patient ID:  Lisa Patterson is a 26 y.o. female who presents for No chief complaint on file.    The patient location is: [place], [place]  The chief complaint leading to consultation is: acne    Visit type: audiovisual    Face to Face time with patient: 18 minutes  21 minutes of total time spent on the encounter, which includes face to face time and non-face to face time preparing to see the patient (eg, review of tests), Obtaining and/or reviewing separately obtained history, Documenting clinical information in the electronic or other health record, Independently interpreting results (not separately reported) and communicating results to the patient/family/caregiver, or Care coordination (not separately reported).         Each patient to whom he or she provides medical services by telemedicine is:  (1) informed of the relationship between the physician and patient and the respective role of any other health care provider with respect to management of the patient; and (2) notified that he or she may decline to receive medical services by telemedicine and may withdraw from such care at any time.    Notes:   C/o acne, admits to h/o acne as teen that got better, but had ultimately worsened with IUDs (no longer using- removed about 2 months ago). Some improvement with cessation of IUD, but still having frequent flares. Was seen by outside derm, but wants a 2nd opinion. Flares daily to every 2 days. Location: face. +Red tender pimples, discoloration. Hx of irregular menses. Denies flushing or sensitive skin.     Current tx: metro lotion 0.75% BID, azelaic acid 15% gel qd, altreeno 0.05% qhs (denies irritation), Cera Ve acne bp cleanser BID, Sodium Sulfacetamide 4.5 % cleanser qAM.     Previous tx: epiduo, finacea foam (liked); doxy (decreased appetite); spironolactone (weight loss)      Review of Systems     Objective:    Physical Exam   Constitutional: She appears well-developed and well-nourished. No  distress.   Neurological: She is alert and oriented to person, place, and time. She is not disoriented.   Psychiatric: She has a normal mood and affect.   Skin:   Areas Examined (abnormalities noted in diagram):   Head / Face Inspection Performed  Neck Inspection Performed  Chest / Axilla Inspection Performed  Back Inspection Performed  RUE Inspected  LUE Inspection Performed              Diagram Legend     Erythematous scaling macule/papule c/w actinic keratosis       Vascular papule c/w angioma      Pigmented verrucoid papule/plaque c/w seborrheic keratosis      Yellow umbilicated papule c/w sebaceous hyperplasia      Irregularly shaped tan macule c/w lentigo     1-2 mm smooth white papules consistent with Milia      Movable subcutaneous cyst with punctum c/w epidermal inclusion cyst      Subcutaneous movable cyst c/w pilar cyst      Firm pink to brown papule c/w dermatofibroma      Pedunculated fleshy papule(s) c/w skin tag(s)      Evenly pigmented macule c/w junctional nevus     Mildly variegated pigmented, slightly irregular-bordered macule c/w mildly atypical nevus      Flesh colored to evenly pigmented papule c/w intradermal nevus       Pink pearly papule/plaque c/w basal cell carcinoma      Erythematous hyperkeratotic cursted plaque c/w SCC      Surgical scar with no sign of skin cancer recurrence      Open and closed comedones      Inflammatory papules and pustules      Verrucoid papule consistent consistent with wart     Erythematous eczematous patches and plaques     Dystrophic onycholytic nail with subungual debris c/w onychomycosis     Umbilicated papule    Erythematous-base heme-crusted tan verrucoid plaque consistent with inflamed seborrheic keratosis     Erythematous Silvery Scaling Plaque c/w Psoriasis     See annotation      Assessment / Plan:        Acne vulgaris  -     azelaic acid (FINACEA) 15 % Foam; AAA thin layer to face qd as tolerated. May increase to bid if no irritation.  Dispense: 50 g;  Refill: 5  Failed oral doxy and spironolactone. Briefly discussed accutane, but she is not interested at this time. Agree with continuation of current topical regimen, but will try for finacea foam as alternative to the gel version. May continue metro lotion bid, altreeno qhs as tolerated. Would reconsider clinda gel as possible field or spot tx in future. Flares may continue to improve off the IUD. May reconsider oral OCP w/gyn as desired.            Follow up in about 3 months (around 8/9/2022).

## 2022-06-16 ENCOUNTER — TELEPHONE (OUTPATIENT)
Dept: PSYCHIATRY | Facility: CLINIC | Age: 27
End: 2022-06-16
Payer: COMMERCIAL

## 2022-06-20 ENCOUNTER — PATIENT MESSAGE (OUTPATIENT)
Dept: PSYCHIATRY | Facility: CLINIC | Age: 27
End: 2022-06-20
Payer: COMMERCIAL

## 2022-06-20 ENCOUNTER — OFFICE VISIT (OUTPATIENT)
Dept: PSYCHIATRY | Facility: CLINIC | Age: 27
End: 2022-06-20
Payer: COMMERCIAL

## 2022-06-20 ENCOUNTER — PATIENT MESSAGE (OUTPATIENT)
Dept: PSYCHIATRY | Facility: CLINIC | Age: 27
End: 2022-06-20

## 2022-06-20 DIAGNOSIS — Z03.89 NO DIAGNOSIS ON AXIS I: Primary | ICD-10-CM

## 2022-06-20 PROCEDURE — 99499 UNLISTED E&M SERVICE: CPT | Mod: 95,,, | Performed by: PSYCHIATRY & NEUROLOGY

## 2022-06-20 PROCEDURE — 99499 NO LOS: ICD-10-PCS | Mod: 95,,, | Performed by: PSYCHIATRY & NEUROLOGY

## 2022-06-20 RX ORDER — SERTRALINE HYDROCHLORIDE 50 MG/1
75 TABLET, FILM COATED ORAL DAILY
Qty: 45 TABLET | Refills: 2 | Status: SHIPPED | OUTPATIENT
Start: 2022-06-20 | End: 2022-11-17 | Stop reason: SDUPTHER

## 2022-08-03 ENCOUNTER — PATIENT MESSAGE (OUTPATIENT)
Dept: PSYCHIATRY | Facility: CLINIC | Age: 27
End: 2022-08-03
Payer: COMMERCIAL

## 2022-08-04 RX ORDER — LORAZEPAM 1 MG/1
TABLET ORAL
Qty: 60 TABLET | Refills: 2 | Status: SHIPPED | OUTPATIENT
Start: 2022-08-04 | End: 2022-11-17 | Stop reason: SDUPTHER

## 2022-08-25 ENCOUNTER — TELEPHONE (OUTPATIENT)
Dept: PSYCHIATRY | Facility: CLINIC | Age: 27
End: 2022-08-25
Payer: COMMERCIAL

## 2022-09-29 ENCOUNTER — OFFICE VISIT (OUTPATIENT)
Dept: INTERNAL MEDICINE | Facility: CLINIC | Age: 27
End: 2022-09-29
Payer: COMMERCIAL

## 2022-09-29 DIAGNOSIS — M79.10 MYALGIA: Primary | ICD-10-CM

## 2022-09-29 DIAGNOSIS — M54.9 BACK PAIN, UNSPECIFIED BACK LOCATION, UNSPECIFIED BACK PAIN LATERALITY, UNSPECIFIED CHRONICITY: ICD-10-CM

## 2022-09-29 PROCEDURE — 99213 PR OFFICE/OUTPT VISIT, EST, LEVL III, 20-29 MIN: ICD-10-PCS | Mod: 95,,, | Performed by: NURSE PRACTITIONER

## 2022-09-29 PROCEDURE — 99213 OFFICE O/P EST LOW 20 MIN: CPT | Mod: 95,,, | Performed by: NURSE PRACTITIONER

## 2022-09-29 RX ORDER — NAPROXEN 500 MG/1
500 TABLET ORAL 2 TIMES DAILY PRN
Qty: 30 TABLET | Refills: 0 | Status: SHIPPED | OUTPATIENT
Start: 2022-09-29 | End: 2023-08-29

## 2022-09-29 NOTE — ASSESSMENT & PLAN NOTE
R/o viral etiology with possibly subjective fever last night and exposure to covid in the past week.   If no improvement, needs to follow up in office with PCP.

## 2022-09-29 NOTE — PROGRESS NOTES
Subjective:       Patient ID: Lisa Patterson is a 27 y.o. female.    Chief Complaint: No chief complaint on file.    The patient location is: [place]  The chief complaint leading to consultation is: Myalgias, back pain    Visit type: audiovisual    Face to Face time with patient: 10 minutes  13 minutes of total time spent on the encounter, which includes face to face time and non-face to face time preparing to see the patient (eg, review of tests), Obtaining and/or reviewing separately obtained history, Documenting clinical information in the electronic or other health record, Independently interpreting results (not separately reported) and communicating results to the patient/family/caregiver, or Care coordination (not separately reported).     Each patient to whom he or she provides medical services by telemedicine is:  (1) informed of the relationship between the physician and patient and the respective role of any other health care provider with respect to management of the patient; and (2) notified that he or she may decline to receive medical services by telemedicine and may withdraw from such care at any time.    Notes:   Mrs. Patterson presents to visit with complaint of arm and back pain, also complaining of headache and insomnia for approx 2-3 days. Arm pain has been constant has been going on for 2-3 days as well. Located to bilateral forearms. Back pain worse in morning and at night. Back pain radiates in front. Has not been able to exercise over the past week due to symptoms. Had sweats and chills last night so unsure if she had fever. Has been taking advil for pain. Last dose this morning. Father did have COVID 2 weeks ago. No other known ill contacts. Denies any urinary symptoms.     Back Pain  This is a recurrent problem. The current episode started in the past 7 days. The problem occurs daily. The problem has been waxing and waning since onset. The pain is present in the lumbar spine. The quality of  the pain is described as cramping. The pain radiates to the left thigh and right thigh. The pain is at a severity of 6/10. The pain is moderate. The pain is Worse during the night. The symptoms are aggravated by position, lying down, sitting and stress. Stiffness is present In the morning. Associated symptoms include abdominal pain (upper, bilateral), chest pain (radiating from back), headaches, leg pain, numbness (intermittent, bilateral arms), pelvic pain and weakness. Pertinent negatives include no bladder incontinence, bowel incontinence, dysuria, fever, paresis, paresthesias, perianal numbness, tingling or weight loss. Risk factors include history of cancer. She has tried NSAIDs and bed rest for the symptoms. The treatment provided no relief.     Patient Active Problem List   Diagnosis    Anxiety    Back pain    Myalgia       Family History   Problem Relation Age of Onset    Thyroid disease Mother     Breast cancer Maternal Grandfather      History reviewed. No pertinent surgical history.      Current Outpatient Medications:     azelaic acid (FINACEA) 15 % Foam, AAA thin layer to face qd as tolerated. May increase to bid if no irritation., Disp: 50 g, Rfl: 5    LORazepam (ATIVAN) 1 MG tablet, Take 1/2 to 1 tablet twice daily as needed., Disp: 60 tablet, Rfl: 2    metroNIDAZOLE 0.75 % Lotn, Apply topically 2 (two) times daily., Disp: , Rfl:     naproxen (NAPROSYN) 500 MG tablet, Take 1 tablet (500 mg total) by mouth 2 (two) times daily as needed (pain)., Disp: 30 tablet, Rfl: 0    sertraline (ZOLOFT) 50 MG tablet, Take 1.5 tablets (75 mg total) by mouth once daily., Disp: 45 tablet, Rfl: 2    spironolactone (ALDACTONE) 100 MG tablet, Take 100 mg by mouth once daily., Disp: , Rfl:     sulfacetamide sodium-sulfur 9-4.5 % Clsr, , Disp: , Rfl:     Review of Systems   Constitutional:  Positive for activity change, appetite change, chills, diaphoresis and fatigue. Negative for fever and weight loss.   HENT:  Positive  for postnasal drip and rhinorrhea. Negative for congestion, sinus pressure, sinus pain, sneezing and sore throat.    Cardiovascular:  Positive for chest pain (radiating from back). Negative for palpitations.   Gastrointestinal:  Positive for abdominal pain (upper, bilateral) and nausea. Negative for blood in stool, bowel incontinence, constipation, diarrhea and vomiting.   Endocrine: Negative for polyuria.   Genitourinary:  Positive for pelvic pain. Negative for bladder incontinence, dysuria, flank pain, frequency and hematuria.   Musculoskeletal:  Positive for arthralgias, back pain and myalgias.   Neurological:  Positive for weakness, numbness (intermittent, bilateral arms) and headaches. Negative for dizziness, tingling, light-headedness and paresthesias.     Objective:   There were no vitals taken for this visit.     Physical Exam  Constitutional:       General: She is not in acute distress.     Appearance: Normal appearance. She is not ill-appearing.   Pulmonary:      Effort: Pulmonary effort is normal. No respiratory distress.   Neurological:      General: No focal deficit present.      Mental Status: She is alert and oriented to person, place, and time.   Psychiatric:         Mood and Affect: Mood normal.       Assessment & Plan     Problem List Items Addressed This Visit          Orthopedic    Back pain    Current Assessment & Plan     Continue NSAIDs  Heating pad  Rest  Hydration           Myalgia - Primary    Current Assessment & Plan     R/o viral etiology with possibly subjective fever last night and exposure to covid in the past week.   If no improvement, needs to follow up in office with PCP.          Relevant Medications    naproxen (NAPROSYN) 500 MG tablet    Other Relevant Orders    POCT COVID-19 Rapid Screening    POCT Influenza A/B Molecular       Follow up if symptoms worsen or fail to improve.          Portions of this note may have been created with voice recognition software. Occasional  ""wrong-word" or "sound-a-like" substitutions may have occurred due to the inherent limitations of voice recognition software. Please, read the note carefully and recognize, using context, where substitutions have occurred.         "

## 2022-10-27 ENCOUNTER — OFFICE VISIT (OUTPATIENT)
Dept: ENDOCRINOLOGY | Facility: CLINIC | Age: 27
End: 2022-10-27
Payer: COMMERCIAL

## 2022-10-27 ENCOUNTER — PATIENT MESSAGE (OUTPATIENT)
Dept: ENDOCRINOLOGY | Facility: CLINIC | Age: 27
End: 2022-10-27

## 2022-10-27 VITALS
BODY MASS INDEX: 18.96 KG/M2 | HEART RATE: 67 BPM | OXYGEN SATURATION: 99 % | WEIGHT: 117.94 LBS | SYSTOLIC BLOOD PRESSURE: 102 MMHG | DIASTOLIC BLOOD PRESSURE: 66 MMHG | HEIGHT: 66 IN

## 2022-10-27 DIAGNOSIS — N92.6 IRREGULAR PERIODS/MENSTRUAL CYCLES: ICD-10-CM

## 2022-10-27 PROCEDURE — 99204 OFFICE O/P NEW MOD 45 MIN: CPT | Mod: S$GLB,,, | Performed by: INTERNAL MEDICINE

## 2022-10-27 PROCEDURE — 99999 PR PBB SHADOW E&M-EST. PATIENT-LVL III: ICD-10-PCS | Mod: PBBFAC,,, | Performed by: INTERNAL MEDICINE

## 2022-10-27 PROCEDURE — 99204 PR OFFICE/OUTPT VISIT, NEW, LEVL IV, 45-59 MIN: ICD-10-PCS | Mod: S$GLB,,, | Performed by: INTERNAL MEDICINE

## 2022-10-27 PROCEDURE — 99999 PR PBB SHADOW E&M-EST. PATIENT-LVL III: CPT | Mod: PBBFAC,,, | Performed by: INTERNAL MEDICINE

## 2022-10-27 NOTE — ASSESSMENT & PLAN NOTE
PCOS criteria:  1) postmenarchal irregular menstrual cycles  2) clinical hyperandrogenism:  FG Score: 3  3) has not had US of the ovaries    Need to exclude the following:  TSH, 17 OHP and PRL    Needs updated CMP to check K/kidney function and liver enzymes    To better characterize insulin resistance:  Oral glucose tolerance test --> if in the preDM/DM range to consider metformin   Fasting lipid profile and evaluate for fatty liver --> in this case consider low dose pioglitazone    Treatment for hyperandrogenism:  Spironolactone --> repeat K/kidney function in one month and titrate spironolactone to 50 mg twice daily, higher in the context of alopecia

## 2022-10-27 NOTE — PROGRESS NOTES
Subjective:      Patient ID: Lisa Taylor is a 27 y.o. female.    Chief Complaint:  Polycystic Ovary Syndrome      History of Present Illness  Ms. Taylor is a 27 year old woman who is here for evaluation of PCOS in the setting of   Cystic acne and terminal hair   Irregular menstrual cycles    Used IUDs in the past, last time was over one year ago  Has used OCPs but due to migraines with aura stopped    Dermatology: doxycycline and spironolactone --> started in 2018 (dermatology, gyn and holostic doctor). Stopped in 2021, restarted one month ago.     Menarche at 14 years.   Cycles were very irregular  Cycles have never been regular. Has 8 - 10 cycles per year     Terminal hair started ten years ago. First noted over areola bilateral, chest, below umbilicus, chin and cheeks, necks.     Anxiety and depression on treatment with sertraline and lorazepam.     Review of Systems  URI + fever last week.   Denies other symptoms.     Objective:   Physical Exam  Constitutional:       General: She is not in acute distress.     Appearance: She is well-developed.   Eyes:      General: No scleral icterus.     Conjunctiva/sclera: Conjunctivae normal.      Pupils: Pupils are equal, round, and reactive to light.      Comments: No proptosis.    Neck:      Thyroid: No thyromegaly.      Trachea: No tracheal deviation.   Cardiovascular:      Rate and Rhythm: Normal rate.   Pulmonary:      Effort: Pulmonary effort is normal.      Breath sounds: Normal breath sounds.   Musculoskeletal:      Cervical back: Normal range of motion and neck supple.   Lymphadenopathy:      Cervical: No cervical adenopathy.   Skin:     General: Skin is warm and dry.      Findings: No rash.      Comments: Terminal hair over chin/neck    Neurological:      Mental Status: She is alert and oriented to person, place, and time.      Deep Tendon Reflexes: Reflexes are normal and symmetric.      Comments: No tremor.     Vitals:    10/27/22 1436   BP: 102/66   BP  "Location: Left arm   Patient Position: Sitting   BP Method: Medium (Manual)   Pulse: 67   SpO2: 99%   Weight: 53.5 kg (117 lb 15.1 oz)   Height: 5' 6" (1.676 m)       BP Readings from Last 3 Encounters:   10/27/22 102/66   10/14/22 112/70   03/28/22 120/68     Wt Readings from Last 1 Encounters:   10/27/22 1436 53.5 kg (117 lb 15.1 oz)         Body mass index is 19.04 kg/m².    Lab Review:   No results found for: HGBA1C  No results found for: CHOL, HDL, LDLCALC, TRIG, CHOLHDL  No results found for: NA, K, CL, CO2, GLU, BUN, CREATININE, CALCIUM, PROT, ALBUMIN, BILITOT, ALKPHOS, AST, ALT, ANIONGAP, ESTGFRAFRICA, EGFRNONAA, TSH      Assessment and Plan     Irregular periods/menstrual cycles  PCOS criteria:  1) postmenarchal irregular menstrual cycles  2) clinical hyperandrogenism:  FG Score: 3  3) has not had US of the ovaries    Need to exclude the following:  TSH, 17 OHP and PRL    Needs updated CMP to check K/kidney function and liver enzymes    To better characterize insulin resistance:  Oral glucose tolerance test --> if in the preDM/DM range to consider metformin   Fasting lipid profile and evaluate for fatty liver --> in this case consider low dose pioglitazone    Treatment for hyperandrogenism:  Spironolactone --> repeat K/kidney function in one month and titrate spironolactone to 50 mg twice daily, higher in the context of alopecia            "

## 2022-10-27 NOTE — PATIENT INSTRUCTIONS
PCOS:  1) check male hormones   2) exclude things that look like PCOS: TSH, PRL, 17 OHP    Treatment:  1) OCP - estrogen -> when you have migraines with auras (visual changes) we avoid using estrogen.   2) spironolactone 100 mg daily to 100 mg twice a day. Check labs about one month after starting or increasing dose.   3) other treatment:   Zinc, primrose oil, myoinositol     Metabolic::   Insulin and blood sugar levels - after an overnight fast   Treat if abnormal    Prefers [facility] labs     [email or web address]

## 2022-11-17 ENCOUNTER — PATIENT MESSAGE (OUTPATIENT)
Dept: PSYCHIATRY | Facility: CLINIC | Age: 27
End: 2022-11-17

## 2022-11-17 ENCOUNTER — OFFICE VISIT (OUTPATIENT)
Dept: PSYCHIATRY | Facility: CLINIC | Age: 27
End: 2022-11-17
Payer: COMMERCIAL

## 2022-11-17 DIAGNOSIS — F41.1 GAD (GENERALIZED ANXIETY DISORDER): Primary | ICD-10-CM

## 2022-11-17 PROCEDURE — 99214 PR OFFICE/OUTPT VISIT, EST, LEVL IV, 30-39 MIN: ICD-10-PCS | Mod: 95,,, | Performed by: PSYCHIATRY & NEUROLOGY

## 2022-11-17 PROCEDURE — 99999 PR PBB SHADOW E&M-EST. PATIENT-LVL II: ICD-10-PCS | Mod: PBBFAC,,, | Performed by: PSYCHIATRY & NEUROLOGY

## 2022-11-17 PROCEDURE — 99999 PR PBB SHADOW E&M-EST. PATIENT-LVL II: CPT | Mod: PBBFAC,,, | Performed by: PSYCHIATRY & NEUROLOGY

## 2022-11-17 PROCEDURE — 99214 OFFICE O/P EST MOD 30 MIN: CPT | Mod: 95,,, | Performed by: PSYCHIATRY & NEUROLOGY

## 2022-11-17 RX ORDER — SERTRALINE HYDROCHLORIDE 50 MG/1
75 TABLET, FILM COATED ORAL DAILY
Qty: 45 TABLET | Refills: 3 | Status: SHIPPED | OUTPATIENT
Start: 2022-11-17 | End: 2023-01-11

## 2022-11-17 RX ORDER — LORAZEPAM 1 MG/1
TABLET ORAL
Qty: 60 TABLET | Refills: 3 | Status: SHIPPED | OUTPATIENT
Start: 2022-11-17 | End: 2023-03-17 | Stop reason: SDUPTHER
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
## 2022-11-19 ENCOUNTER — PATIENT MESSAGE (OUTPATIENT)
Dept: ENDOCRINOLOGY | Facility: CLINIC | Age: 27
End: 2022-11-19
Payer: COMMERCIAL

## 2022-11-30 ENCOUNTER — TELEPHONE (OUTPATIENT)
Dept: DERMATOLOGY | Facility: CLINIC | Age: 27
End: 2022-11-30
Payer: COMMERCIAL

## 2022-11-30 ENCOUNTER — PATIENT MESSAGE (OUTPATIENT)
Dept: DERMATOLOGY | Facility: CLINIC | Age: 27
End: 2022-11-30
Payer: COMMERCIAL

## 2022-11-30 NOTE — TELEPHONE ENCOUNTER
Attempted to call patient to reschedule appointment with Nirmala Anderson on December 8th , she won't be in office. No answer. LVM.

## 2023-01-24 ENCOUNTER — PATIENT MESSAGE (OUTPATIENT)
Dept: PSYCHIATRY | Facility: CLINIC | Age: 28
End: 2023-01-24

## 2023-02-09 ENCOUNTER — PATIENT MESSAGE (OUTPATIENT)
Dept: PSYCHIATRY | Facility: CLINIC | Age: 28
End: 2023-02-09

## 2023-03-15 ENCOUNTER — TELEPHONE (OUTPATIENT)
Dept: PSYCHIATRY | Facility: CLINIC | Age: 28
End: 2023-03-15

## 2023-03-17 ENCOUNTER — OFFICE VISIT (OUTPATIENT)
Dept: PSYCHIATRY | Facility: CLINIC | Age: 28
End: 2023-03-17
Payer: COMMERCIAL

## 2023-03-17 DIAGNOSIS — G47.00 INSOMNIA, UNSPECIFIED TYPE: ICD-10-CM

## 2023-03-17 DIAGNOSIS — F41.1 GAD (GENERALIZED ANXIETY DISORDER): Primary | ICD-10-CM

## 2023-03-17 PROCEDURE — 99214 OFFICE O/P EST MOD 30 MIN: CPT | Mod: 95,,, | Performed by: PSYCHIATRY & NEUROLOGY

## 2023-03-17 PROCEDURE — 99214 PR OFFICE/OUTPT VISIT, EST, LEVL IV, 30-39 MIN: ICD-10-PCS | Mod: 95,,, | Performed by: PSYCHIATRY & NEUROLOGY

## 2023-03-17 PROCEDURE — 90833 PSYTX W PT W E/M 30 MIN: CPT | Mod: 95,,, | Performed by: PSYCHIATRY & NEUROLOGY

## 2023-03-17 PROCEDURE — 90833 PR PSYCHOTHERAPY W/PATIENT W/E&M, 30 MIN (ADD ON): ICD-10-PCS | Mod: 95,,, | Performed by: PSYCHIATRY & NEUROLOGY

## 2023-03-17 RX ORDER — SERTRALINE HYDROCHLORIDE 50 MG/1
TABLET, FILM COATED ORAL
Qty: 45 TABLET | Refills: 3 | Status: SHIPPED | OUTPATIENT
Start: 2023-03-17 | End: 2023-07-06 | Stop reason: SDUPTHER

## 2023-03-17 RX ORDER — LORAZEPAM 1 MG/1
TABLET ORAL
Qty: 60 TABLET | Refills: 3 | Status: SHIPPED | OUTPATIENT
Start: 2023-03-17 | End: 2023-07-06 | Stop reason: SDUPTHER
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
## 2023-04-18 ENCOUNTER — OFFICE VISIT (OUTPATIENT)
Dept: OPHTHALMOLOGY | Facility: CLINIC | Age: 28
End: 2023-04-18
Payer: COMMERCIAL

## 2023-04-18 ENCOUNTER — PATIENT MESSAGE (OUTPATIENT)
Dept: OPHTHALMOLOGY | Facility: CLINIC | Age: 28
End: 2023-04-18

## 2023-04-18 DIAGNOSIS — H16.9 KERATITIS, RIGHT: Primary | ICD-10-CM

## 2023-04-18 DIAGNOSIS — Z46.0 ENCOUNTER FOR FITTING OR ADJUSTMENT OF SPECTACLES OR CONTACT LENSES: ICD-10-CM

## 2023-04-18 DIAGNOSIS — H53.002 AMBLYOPIA OF LEFT EYE: ICD-10-CM

## 2023-04-18 DIAGNOSIS — H52.7 REFRACTIVE ERRORS: ICD-10-CM

## 2023-04-18 PROCEDURE — 92015 PR REFRACTION: ICD-10-PCS | Mod: ,,, | Performed by: OPTOMETRIST

## 2023-04-18 PROCEDURE — 92004 COMPRE OPH EXAM NEW PT 1/>: CPT | Mod: S$PBB,,, | Performed by: OPTOMETRIST

## 2023-04-18 PROCEDURE — 92004 PR EYE EXAM, NEW PATIENT,COMPREHESV: ICD-10-PCS | Mod: S$PBB,,, | Performed by: OPTOMETRIST

## 2023-04-18 PROCEDURE — 99999 PR PBB SHADOW E&M-EST. PATIENT-LVL III: CPT | Mod: PBBFAC,,, | Performed by: OPTOMETRIST

## 2023-04-18 PROCEDURE — 99999 PR PBB SHADOW E&M-EST. PATIENT-LVL III: ICD-10-PCS | Mod: PBBFAC,,, | Performed by: OPTOMETRIST

## 2023-04-18 PROCEDURE — 92015 DETERMINE REFRACTIVE STATE: CPT | Mod: ,,, | Performed by: OPTOMETRIST

## 2023-04-20 ENCOUNTER — PATIENT MESSAGE (OUTPATIENT)
Dept: OPHTHALMOLOGY | Facility: CLINIC | Age: 28
End: 2023-04-20

## 2023-04-20 ENCOUNTER — PATIENT MESSAGE (OUTPATIENT)
Dept: OPTOMETRY | Facility: CLINIC | Age: 28
End: 2023-04-20

## 2023-05-03 ENCOUNTER — OFFICE VISIT (OUTPATIENT)
Dept: PSYCHIATRY | Facility: CLINIC | Age: 28
End: 2023-05-03

## 2023-05-03 DIAGNOSIS — F50.01 ANOREXIA NERVOSA, RESTRICTING TYPE, MILD: ICD-10-CM

## 2023-05-03 DIAGNOSIS — F41.1 GAD (GENERALIZED ANXIETY DISORDER): ICD-10-CM

## 2023-05-03 DIAGNOSIS — F33.0 MDD (MAJOR DEPRESSIVE DISORDER), RECURRENT EPISODE, MILD: Primary | ICD-10-CM

## 2023-05-03 PROCEDURE — 90834 PSYTX W PT 45 MINUTES: CPT | Mod: 95,,, | Performed by: SOCIAL WORKER

## 2023-05-03 PROCEDURE — 90834 PR PSYCHOTHERAPY W/PATIENT, 45 MIN: ICD-10-PCS | Mod: 95,,, | Performed by: SOCIAL WORKER
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
## 2023-05-09 ENCOUNTER — PATIENT MESSAGE (OUTPATIENT)
Dept: PSYCHIATRY | Facility: CLINIC | Age: 28
End: 2023-05-09
Payer: COMMERCIAL

## 2023-05-11 ENCOUNTER — PATIENT MESSAGE (OUTPATIENT)
Dept: PSYCHIATRY | Facility: CLINIC | Age: 28
End: 2023-05-11
Payer: COMMERCIAL

## 2023-05-11 NOTE — TELEPHONE ENCOUNTER
Lisa Patterson did not attend therapy appt as scheduled 05/11/2023. No show letter was mailed. This was 1st  NS.   Pt sent message indicated she was at her mothers house and was talking to her about a situation at the time of her session. Pt was contacted by MA 10 min later. No response. Pt contacted via [employer] by shy 15 min into session. No response.

## 2023-06-01 ENCOUNTER — OFFICE VISIT (OUTPATIENT)
Dept: PSYCHIATRY | Facility: CLINIC | Age: 28
End: 2023-06-01
Payer: COMMERCIAL

## 2023-06-01 DIAGNOSIS — F41.1 GAD (GENERALIZED ANXIETY DISORDER): ICD-10-CM

## 2023-06-01 DIAGNOSIS — F33.0 MDD (MAJOR DEPRESSIVE DISORDER), RECURRENT EPISODE, MILD: Primary | ICD-10-CM

## 2023-06-01 PROCEDURE — 90834 PSYTX W PT 45 MINUTES: CPT | Mod: 95,,, | Performed by: SOCIAL WORKER

## 2023-06-01 PROCEDURE — 90834 PR PSYCHOTHERAPY W/PATIENT, 45 MIN: ICD-10-PCS | Mod: 95,,, | Performed by: SOCIAL WORKER

## 2023-06-01 NOTE — PROGRESS NOTES
The patient location is: [address] [place] [address]   [facility] [address]  [address] [place]   The patient location [place] is: [facility]   The patient phone number is: [phone] (Mobile)  Visit type: Virtual visit with synchronous audio and video  Each patient to whom he or she provides medical services by telemedicine is:  (1) informed of the relationship between the physician and patient and the respective role of any other health care provider with respect to management of the patient; and (2) notified that he or she may decline to receive medical services by telemedicine and may withdraw from such care at any time.  Crisis Disclaimer: Patient was informed that due to the virtual nature of the visit, that if a crisis develops, protocols will be implemented to ensure patient safety, including but not limited to: 1) Initiating a welfare check with local Law Enforcement, 2) Calling [facility]/National Crisis Hotline, and/or 3) Initiating PEC/CEC procedures.    Individual Psychotherapy (PhD/LCSW)    06/01/2023    Interim Events/Subjective Report/Content of Current Session:  follow-up appointment.    Pt is a 27 y.o. female with past psychiatric hx of depression and anxiety who presents for follow-up treatment.  Continuing to apply for jobs and interview but no offers yet. Creating increased feelings of depression, anxiety and lack of self worth.  Denied coping skills being practiced. Focused on small achievable goals.   Goals to create a morning routine, taking meds on time, going for a walk or run, and focusing on emotional needs.     Pt was tearful at times during the session. Reported depressed mood and trouble with self esteem at this time.   Plan to begin practicing goals identified and continue to reach out to support system   Will continue to follow.   Pt aware to contact  for any additional needs that may occur prior to next session.    Therapeutic Intervention/Techniques: supportive , behavior  modification and interactive; relevant to diagnosis, patient responds to this modality    Risk Parameters:  Patient reports no suicidal ideation  Patient reports no homicidal ideation  Patient reports no self-injurious behavior  Patient reports no violent behavior    Diagnosis:     Return to Clinic: as scheduled  Counseling time: 45  -Call to report any worsening of symptoms or problems associated with medication.  - Pt instructed to go to ER if thoughts of harming self or others arise.   -Supportive therapy and psychoeducation provided  -Pt instructed to call clinic, 911 or go to nearest emergency room if sxs worsen or pt is in crisis. The pt expresses understanding.   Each patient to whom he or she provides medical services by telemedicine is:  (1) informed of the relationship between the physician and patient and the respective role of any other health care provider with respect to management of the patient; and (2) notified that he or she may decline to receive medical services by telemedicine and may withdraw from such care at any time.

## 2023-06-06 ENCOUNTER — PATIENT MESSAGE (OUTPATIENT)
Dept: PSYCHIATRY | Facility: CLINIC | Age: 28
End: 2023-06-06
Payer: COMMERCIAL

## 2023-06-13 ENCOUNTER — OFFICE VISIT (OUTPATIENT)
Dept: PSYCHIATRY | Facility: CLINIC | Age: 28
End: 2023-06-13
Payer: COMMERCIAL

## 2023-06-13 DIAGNOSIS — F33.0 MDD (MAJOR DEPRESSIVE DISORDER), RECURRENT EPISODE, MILD: Primary | ICD-10-CM

## 2023-06-13 DIAGNOSIS — F41.1 GAD (GENERALIZED ANXIETY DISORDER): ICD-10-CM

## 2023-06-13 PROCEDURE — 90834 PSYTX W PT 45 MINUTES: CPT | Mod: 95,,, | Performed by: SOCIAL WORKER

## 2023-06-13 PROCEDURE — 90834 PR PSYCHOTHERAPY W/PATIENT, 45 MIN: ICD-10-PCS | Mod: 95,,, | Performed by: SOCIAL WORKER

## 2023-06-13 NOTE — PROGRESS NOTES
The patient location is: [address] [place] [address]   [place] [address]  [address] [place]   The patient location [place] is: [facility]   The patient phone number is: [phone] (Mobile)  Visit type: Virtual visit with synchronous audio and video  Each patient to whom he or she provides medical services by telemedicine is:  (1) informed of the relationship between the physician and patient and the respective role of any other health care provider with respect to management of the patient; and (2) notified that he or she may decline to receive medical services by telemedicine and may withdraw from such care at any time.  Crisis Disclaimer: Patient was informed that due to the virtual nature of the visit, that if a crisis develops, protocols will be implemented to ensure patient safety, including but not limited to: 1) Initiating a welfare check with local Law Enforcement, 2) Calling 1/National Crisis Hotline, and/or 3) Initiating PEC/CEC procedures.    Individual Psychotherapy (PhD/LCSW)    06/13/2023    Interim Events/Subjective Report/Content of Current Session:  follow-up appointment.    Pt is a 27 y.o. female with past psychiatric hx of depression, eating disorder, and anxiety who presents for follow-up treatment.  Continuing to apply for jobs and interview.  Increased feelings of depression, anxiety and lack of self worth.  Stated that she had a difficult weekend with depression.   Previous session pt identified goals to create a morning routine, taking meds on time, going for a walk or run, and focusing on emotional needs.   Pt stated that she has been practicing these techniques. Reported doing well with these goals.     Pt identified she is having to find somewhere to live next month. Stated that she was given the opportunity to live at an assisted living her parents just opened that has open apartments.  Pt unsure what she would want to do at this time. Practiced cost benefit analysis   Will  continue to follow.   Pt aware to contact sw for any additional needs that may occur prior to next session.    Therapeutic Intervention/Techniques: supportive , behavior modification and interactive; relevant to diagnosis, patient responds to this modality    Risk Parameters:  Patient reports no suicidal ideation  Patient reports no homicidal ideation  Patient reports no self-injurious behavior  Patient reports no violent behavior    Diagnosis:     Return to Clinic: as scheduled  Counseling time: 45  -Call to report any worsening of symptoms or problems associated with medication.  - Pt instructed to go to ER if thoughts of harming self or others arise.   -Supportive therapy and psychoeducation provided  -Pt instructed to call clinic, 911 or go to nearest emergency room if sxs worsen or pt is in crisis. The pt expresses understanding.   Each patient to whom he or she provides medical services by telemedicine is:  (1) informed of the relationship between the physician and patient and the respective role of any other health care provider with respect to management of the patient; and (2) notified that he or she may decline to receive medical services by telemedicine and may withdraw from such care at any time.       never

## 2023-06-30 ENCOUNTER — OFFICE VISIT (OUTPATIENT)
Dept: PSYCHIATRY | Facility: CLINIC | Age: 28
End: 2023-06-30
Payer: COMMERCIAL

## 2023-06-30 DIAGNOSIS — F33.0 MDD (MAJOR DEPRESSIVE DISORDER), RECURRENT EPISODE, MILD: Primary | ICD-10-CM

## 2023-06-30 DIAGNOSIS — F41.1 GAD (GENERALIZED ANXIETY DISORDER): ICD-10-CM

## 2023-06-30 DIAGNOSIS — F50.01 ANOREXIA NERVOSA, RESTRICTING TYPE, MILD: ICD-10-CM

## 2023-06-30 PROCEDURE — 90785 PSYTX COMPLEX INTERACTIVE: CPT | Mod: 95,,, | Performed by: SOCIAL WORKER

## 2023-06-30 PROCEDURE — 90834 PR PSYCHOTHERAPY W/PATIENT, 45 MIN: ICD-10-PCS | Mod: 95,,, | Performed by: SOCIAL WORKER

## 2023-06-30 PROCEDURE — 90834 PSYTX W PT 45 MINUTES: CPT | Mod: 95,,, | Performed by: SOCIAL WORKER

## 2023-06-30 PROCEDURE — 90785 PR INTERACTIVE COMPLEXITY: ICD-10-PCS | Mod: 95,,, | Performed by: SOCIAL WORKER

## 2023-06-30 NOTE — PROGRESS NOTES
The patient location is: [address] [place] [address]   [facility] [address]  [address] [place]   The patient location [place] is: [facility]   The patient phone number is: [phone] (Mobile)  Visit type: Virtual visit with synchronous audio and video  Each patient to whom he or she provides medical services by telemedicine is:  (1) informed of the relationship between the physician and patient and the respective role of any other health care provider with respect to management of the patient; and (2) notified that he or she may decline to receive medical services by telemedicine and may withdraw from such care at any time.  Crisis Disclaimer: Patient was informed that due to the virtual nature of the visit, that if a crisis develops, protocols will be implemented to ensure patient safety, including but not limited to: 1) Initiating a welfare check with local Law Enforcement, 2) Calling 1/National Crisis Hotline, and/or 3) Initiating PEC/CEC procedures.    Individual Psychotherapy (PhD/LCSW)    06/30/2023    Interim Events/Subjective Report/Content of Current Session:  follow-up appointment.    Pt is a 27 y.o. female with past psychiatric hx of depression and anxiety who presents for follow-up treatment.    Pt stated she received a job offer. Indicated she is not taking the job due to low pay and in another state with low pay.   Pt stated that she is also performing a cost benefit analysis about moving into her family's assisted living vs speaking with her mom about supporting her through another 6+months.   Discussed cost and benefit further to process speaking to her mom.   Spent time identifying barriers she has overcome during the last 4 months. Times of depressive episodes, anxiety, panic and adjustment have interrupted her while also took time to reframe thoughts and emotions to continue moving forward. Provided support and positive psychology.     Mood stable, motivated. Congruent to situation  .    Therapeutic Intervention/Techniques: behavior modification, insight oriented, interactive, and supportive; relevant to diagnosis, patient responds to this modality    Risk Parameters:  Patient reports no suicidal ideation  Patient reports no homicidal ideation  Patient reports no self-injurious behavior  Patient reports no violent behavior    Diagnosis:   1. MDD (major depressive disorder), recurrent episode, mild        2. NAZ (generalized anxiety disorder)        3. Anorexia nervosa, restricting type, mild              Return to Clinic: as scheduled  Counseling time: 45  -Call to report any worsening of symptoms or problems associated with medication.  - Pt instructed to go to ER if thoughts of harming self or others arise.   -Supportive therapy and psychoeducation provided  -Pt instructed to call clinic, 911 or go to nearest emergency room if sxs worsen or pt is in crisis. The pt expresses understanding.   Each patient to whom he or she provides medical services by telemedicine is:  (1) informed of the relationship between the physician and patient and the respective role of any other health care provider with respect to management of the patient; and (2) notified that he or she may decline to receive medical services by telemedicine and may withdraw from such care at any time.

## 2023-07-05 ENCOUNTER — TELEPHONE (OUTPATIENT)
Dept: PSYCHIATRY | Facility: CLINIC | Age: 28
End: 2023-07-05
Payer: COMMERCIAL

## 2023-07-06 ENCOUNTER — TELEPHONE (OUTPATIENT)
Dept: PSYCHIATRY | Facility: CLINIC | Age: 28
End: 2023-07-06
Payer: COMMERCIAL

## 2023-07-06 RX ORDER — LORAZEPAM 1 MG/1
TABLET ORAL
Qty: 60 TABLET | Refills: 1 | Status: SHIPPED | OUTPATIENT
Start: 2023-07-14 | End: 2023-08-25 | Stop reason: SDUPTHER

## 2023-07-06 RX ORDER — SERTRALINE HYDROCHLORIDE 50 MG/1
TABLET, FILM COATED ORAL
Qty: 45 TABLET | Refills: 1 | Status: SHIPPED | OUTPATIENT
Start: 2023-07-14 | End: 2023-08-25

## 2023-07-18 ENCOUNTER — OFFICE VISIT (OUTPATIENT)
Dept: PSYCHIATRY | Facility: CLINIC | Age: 28
End: 2023-07-18
Payer: COMMERCIAL

## 2023-07-18 DIAGNOSIS — F50.01 ANOREXIA NERVOSA, RESTRICTING TYPE, MILD: ICD-10-CM

## 2023-07-18 DIAGNOSIS — F41.1 GAD (GENERALIZED ANXIETY DISORDER): ICD-10-CM

## 2023-07-18 DIAGNOSIS — F33.0 MDD (MAJOR DEPRESSIVE DISORDER), RECURRENT EPISODE, MILD: Primary | ICD-10-CM

## 2023-07-18 PROCEDURE — 90834 PR PSYCHOTHERAPY W/PATIENT, 45 MIN: ICD-10-PCS | Mod: 95,,, | Performed by: SOCIAL WORKER

## 2023-07-18 PROCEDURE — 90785 PR INTERACTIVE COMPLEXITY: ICD-10-PCS | Mod: 95,,, | Performed by: SOCIAL WORKER

## 2023-07-18 PROCEDURE — 90834 PSYTX W PT 45 MINUTES: CPT | Mod: 95,,, | Performed by: SOCIAL WORKER

## 2023-07-18 PROCEDURE — 90785 PSYTX COMPLEX INTERACTIVE: CPT | Mod: 95,,, | Performed by: SOCIAL WORKER

## 2023-07-18 NOTE — PROGRESS NOTES
"  The patient location is:  [facility].  The patient location [place] is: [facility]   The patient phone number is: [phone]  Visit type: Virtual visit with synchronous audio and video  Each patient to whom he or she provides medical services by telemedicine is:  (1) informed of the relationship between the physician and patient and the respective role of any other health care provider with respect to management of the patient; and (2) notified that he or she may decline to receive medical services by telemedicine and may withdraw from such care at any time.  Crisis Disclaimer: Patient was informed that due to the virtual nature of the visit, that if a crisis develops, protocols will be implemented to ensure patient safety, including but not limited to: 1) Initiating a welfare check with local Law Enforcement, 2) Calling 1/National Crisis Hotline, and/or 3) Initiating PEC/CEC procedures.    Individual Psychotherapy (PhD/LCSW)    07/18/2023    Interim Events/Subjective Report/Content of Current Session:  follow-up appointment.    Pt is a 27 y.o. female with past psychiatric hx of depression, anxiety, and  who presents for follow-up treatment.    Pt stated her "emotions have been all over the place."   Interviewed for multiple companies. Identified feeling "rejection"  Now living in [facility] parents opened after her lease was up in her apartment.   Pt identified she is experiencing feelings of being discourage due to months of applying for jobs/interviews and only one offer. Focused on feeling rejection and when this occurs how this has become a significant trigger.   Provided assistance with identifying coping skills and self care to increase baseline as pt has shown signs of believing polarized thoughts. Pt and sw practiced reframing thought processes and identified options to use as coping skills.     Therapeutic Intervention/Techniques: behavior modification, insight oriented, interactive, and " supportive; relevant to diagnosis, patient responds to this modality    Risk Parameters:  Patient reports no suicidal ideation  Patient reports no homicidal ideation  Patient reports no self-injurious behavior  Patient reports no violent behavior    Diagnosis:   1. MDD (major depressive disorder), recurrent episode, mild        2. NAZ (generalized anxiety disorder)        3. Anorexia nervosa, restricting type, mild                Return to Clinic: as scheduled  Counseling time:  50  -Call to report any worsening of symptoms or problems associated with medication.  - Pt instructed to go to ER if thoughts of harming self or others arise.   -Supportive therapy and psychoeducation provided  -Pt instructed to call clinic, 911 or go to nearest emergency room if sxs worsen or pt is in crisis. The pt expresses understanding.   Each patient to whom he or she provides medical services by telemedicine is:  (1) informed of the relationship between the physician and patient and the respective role of any other health care provider with respect to management of the patient; and (2) notified that he or she may decline to receive medical services by telemedicine and may withdraw from such care at any time.

## 2023-08-01 ENCOUNTER — OFFICE VISIT (OUTPATIENT)
Dept: PSYCHIATRY | Facility: CLINIC | Age: 28
End: 2023-08-01
Payer: COMMERCIAL

## 2023-08-01 DIAGNOSIS — F41.1 GAD (GENERALIZED ANXIETY DISORDER): ICD-10-CM

## 2023-08-01 DIAGNOSIS — F33.0 MDD (MAJOR DEPRESSIVE DISORDER), RECURRENT EPISODE, MILD: Primary | ICD-10-CM

## 2023-08-01 PROCEDURE — 90785 PSYTX COMPLEX INTERACTIVE: CPT | Mod: 95,,, | Performed by: SOCIAL WORKER

## 2023-08-01 PROCEDURE — 90834 PSYTX W PT 45 MINUTES: CPT | Mod: 95,,, | Performed by: SOCIAL WORKER

## 2023-08-01 PROCEDURE — 90785 PR INTERACTIVE COMPLEXITY: ICD-10-PCS | Mod: 95,,, | Performed by: SOCIAL WORKER

## 2023-08-01 PROCEDURE — 90834 PR PSYCHOTHERAPY W/PATIENT, 45 MIN: ICD-10-PCS | Mod: 95,,, | Performed by: SOCIAL WORKER

## 2023-08-01 NOTE — PROGRESS NOTES
"  The patient location is:  [facility].  The patient location [place] is: [facility] [place]   The patient phone number is: [phone]  Visit type: Virtual visit with synchronous audio and video  Each patient to whom he or she provides medical services by telemedicine is:  (1) informed of the relationship between the physician and patient and the respective role of any other health care provider with respect to management of the patient; and (2) notified that he or she may decline to receive medical services by telemedicine and may withdraw from such care at any time.  Crisis Disclaimer: Patient was informed that due to the virtual nature of the visit, that if a crisis develops, protocols will be implemented to ensure patient safety, including but not limited to: 1) Initiating a welfare check with local Law Enforcement, 2) Calling 1/National Crisis Hotline, and/or 3) Initiating PEC/CEC procedures.    Individual Psychotherapy (PhD/LCSW)    08/01/2023    Interim Events/Subjective Report/Content of Current Session:  follow-up appointment.    Pt is a 27 y.o. [unfilled] with past psychiatric hx of anxiety and depression who presents for follow-up treatment.  Pt stated she does not have "a lot going on". Spending time comparing herself to various people in her life who are in a place in their life where she is wanting to be.   Pt stated she applied for 100 jobs last week. Feeling discouraged and sense of urgency. Discussed plan of action per pt request on what to do to manage emotions. Pt considering making appointment with MD to increase medication or discuss medication change. Also discussed level of care per pt request after seeing a tv show when a character went inpt.Pt denies any current SI/HI. Denies any current A/VH.  Spoke about inpt, IOP and psychotherapy.  Pt and sw discussed increasing self compassion and comparison. Focused on ways to decrease mood interruptions utilizing techniques such as " cognitive defusion, challenging negative thoughts, identifying inner critic.     Mood was depressed. Sadness and longing for change. Anxiety present due to fear of unknown and feeling stagnant.     Will continue to follow.   Pt aware to contact  for any additional needs that may occur prior to next session.  Therapeutic Intervention/Techniques: behavior modification, insight oriented, interactive, and supportive; relevant to diagnosis, patient responds to this modality    Risk Parameters:  Patient reports no suicidal ideation  Patient reports no homicidal ideation  Patient reports no self-injurious behavior  Patient reports no violent behavior    Diagnosis:   1. MDD (major depressive disorder), recurrent episode, mild        2. NAZ (generalized anxiety disorder)            Return to Clinic: as scheduled  Counseling time: 45  -Call to report any worsening of symptoms or problems associated with medication.  - Pt instructed to go to ER if thoughts of harming self or others arise.   -Supportive therapy and psychoeducation provided  -Pt instructed to call clinic, 911 or go to nearest emergency room if sxs worsen or pt is in crisis. The pt expresses understanding.   Each patient to whom he or she provides medical services by telemedicine is:  (1) informed of the relationship between the physician and patient and the respective role of any other health care provider with respect to management of the patient; and (2) notified that he or she may decline to receive medical services by telemedicine and may withdraw from such care at any time.

## 2023-08-11 ENCOUNTER — PATIENT MESSAGE (OUTPATIENT)
Dept: PSYCHIATRY | Facility: CLINIC | Age: 28
End: 2023-08-11
Payer: COMMERCIAL

## 2023-08-14 ENCOUNTER — OFFICE VISIT (OUTPATIENT)
Dept: PSYCHIATRY | Facility: CLINIC | Age: 28
End: 2023-08-14
Payer: COMMERCIAL

## 2023-08-14 DIAGNOSIS — F33.0 MDD (MAJOR DEPRESSIVE DISORDER), RECURRENT EPISODE, MILD: Primary | ICD-10-CM

## 2023-08-14 DIAGNOSIS — F50.01 ANOREXIA NERVOSA, RESTRICTING TYPE, MILD: ICD-10-CM

## 2023-08-14 DIAGNOSIS — F41.1 GAD (GENERALIZED ANXIETY DISORDER): ICD-10-CM

## 2023-08-14 PROCEDURE — 90834 PR PSYCHOTHERAPY W/PATIENT, 45 MIN: ICD-10-PCS | Mod: 95,,, | Performed by: SOCIAL WORKER

## 2023-08-14 PROCEDURE — 90834 PSYTX W PT 45 MINUTES: CPT | Mod: 95,,, | Performed by: SOCIAL WORKER

## 2023-08-14 NOTE — PROGRESS NOTES
"  The patient location is:  [facility] living.  The patient location [place] is: ASHKAN Chavarria   The patient phone number is: [phone]  Visit type: Virtual visit with synchronous audio and video  Each patient to whom he or she provides medical services by telemedicine is:  (1) informed of the relationship between the physician and patient and the respective role of any other health care provider with respect to management of the patient; and (2) notified that he or she may decline to receive medical services by telemedicine and may withdraw from such care at any time.  Crisis Disclaimer: Patient was informed that due to the virtual nature of the visit, that if a crisis develops, protocols will be implemented to ensure patient safety, including but not limited to: 1) Initiating a welfare check with local Law Enforcement, 2) Calling 1/National Crisis Hotline, and/or 3) Initiating PEC/CEC procedures.    Individual Psychotherapy (PhD/LCSW)    08/14/2023    Interim Events/Subjective Report/Content of Current Session:  follow-up appointment.    Pt is a 27 y.o. female with past psychiatric hx of depression and anxiety who presents for follow-up treatment.  Pt staetd she is currently taking a break from applying for jobs as this has become a hindrance to her mental health. Causing increased anxiety and depression as well as triggering lack of self worth.   Pt identifying a plan in place to take a job with her father if nothing comes about by Sept 1. Discussed emotions and thoughts regarding  this plan. Pt hesitant but aware that she is needing to change things and increase income to move forward.  Identified depressed feeling of escapism and "not wanting to be in this place in my life. "  Pt denies any current SI/HI.   Grief expressed in symptoms of longing for a life in [place] and a career she is happy with that gives her purpose.   Doesn't want to go in public bc of not wanting to answer questions about " career  Discussed these thoughts in order to challenge anxiety and negative thinking. Pt was able to attempt to identify positive things as well as concrete factors in her life right now.   Will continue to follow.   Pt aware to contact  for any additional needs that may occur prior to next session.  Therapeutic Intervention/Techniques: behavior modification, insight oriented, interactive, and supportive; relevant to diagnosis, patient responds to this modality    Risk Parameters:  Patient reports no suicidal ideation  Patient reports no homicidal ideation  Patient reports no self-injurious behavior  Patient reports no violent behavior    Diagnosis:   1. MDD (major depressive disorder), recurrent episode, mild        2. NAZ (generalized anxiety disorder)        3. Anorexia nervosa, restricting type, mild              Return to Clinic: as scheduled  Counseling time: 45  -Call to report any worsening of symptoms or problems associated with medication.  - Pt instructed to go to ER if thoughts of harming self or others arise.   -Supportive therapy and psychoeducation provided  -Pt instructed to call clinic, 911 or go to nearest emergency room if sxs worsen or pt is in crisis. The pt expresses understanding.   Each patient to whom he or she provides medical services by telemedicine is:  (1) informed of the relationship between the physician and patient and the respective role of any other health care provider with respect to management of the patient; and (2) notified that he or she may decline to receive medical services by telemedicine and may withdraw from such care at any time.

## 2023-08-24 ENCOUNTER — TELEPHONE (OUTPATIENT)
Dept: PSYCHIATRY | Facility: CLINIC | Age: 28
End: 2023-08-24
Payer: COMMERCIAL

## 2023-08-25 ENCOUNTER — OFFICE VISIT (OUTPATIENT)
Dept: PSYCHIATRY | Facility: CLINIC | Age: 28
End: 2023-08-25
Payer: COMMERCIAL

## 2023-08-25 DIAGNOSIS — F41.1 GAD (GENERALIZED ANXIETY DISORDER): Primary | ICD-10-CM

## 2023-08-25 PROCEDURE — 99213 OFFICE O/P EST LOW 20 MIN: CPT | Mod: 95,,, | Performed by: PSYCHIATRY & NEUROLOGY

## 2023-08-25 PROCEDURE — 99213 PR OFFICE/OUTPT VISIT, EST, LEVL III, 20-29 MIN: ICD-10-PCS | Mod: 95,,, | Performed by: PSYCHIATRY & NEUROLOGY

## 2023-08-25 RX ORDER — LORAZEPAM 1 MG/1
TABLET ORAL
Qty: 60 TABLET | Refills: 1 | Status: SHIPPED | OUTPATIENT
Start: 2023-08-25 | End: 2024-02-05 | Stop reason: SDUPTHER

## 2023-08-25 RX ORDER — SERTRALINE HYDROCHLORIDE 100 MG/1
100 TABLET, FILM COATED ORAL DAILY
Qty: 30 TABLET | Refills: 2 | Status: SHIPPED | OUTPATIENT
Start: 2023-08-25 | End: 2023-12-04
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
## 2023-08-28 ENCOUNTER — PATIENT MESSAGE (OUTPATIENT)
Dept: PSYCHIATRY | Facility: CLINIC | Age: 28
End: 2023-08-28
Payer: COMMERCIAL

## 2023-08-29 ENCOUNTER — PATIENT MESSAGE (OUTPATIENT)
Dept: PSYCHIATRY | Facility: CLINIC | Age: 28
End: 2023-08-29
Payer: COMMERCIAL

## 2023-08-29 ENCOUNTER — OFFICE VISIT (OUTPATIENT)
Dept: FAMILY MEDICINE | Facility: CLINIC | Age: 28
End: 2023-08-29
Payer: COMMERCIAL

## 2023-08-29 DIAGNOSIS — R05.1 ACUTE COUGH: Primary | ICD-10-CM

## 2023-08-29 DIAGNOSIS — J06.9 VIRAL UPPER RESPIRATORY TRACT INFECTION: ICD-10-CM

## 2023-08-29 PROCEDURE — 99202 OFFICE O/P NEW SF 15 MIN: CPT | Mod: 95,,, | Performed by: INTERNAL MEDICINE

## 2023-08-29 PROCEDURE — 99202 PR OFFICE/OUTPT VISIT, NEW, LEVL II, 15-29 MIN: ICD-10-PCS | Mod: 95,,, | Performed by: INTERNAL MEDICINE

## 2023-08-29 RX ORDER — PROMETHAZINE HYDROCHLORIDE AND DEXTROMETHORPHAN HYDROBROMIDE 6.25; 15 MG/5ML; MG/5ML
5 SYRUP ORAL EVERY 4 HOURS PRN
Qty: 118 ML | Refills: 0 | Status: SHIPPED | OUTPATIENT
Start: 2023-08-29 | End: 2023-09-08

## 2023-08-31 ENCOUNTER — OFFICE VISIT (OUTPATIENT)
Dept: PSYCHIATRY | Facility: CLINIC | Age: 28
End: 2023-08-31
Payer: COMMERCIAL

## 2023-08-31 DIAGNOSIS — F50.01 ANOREXIA NERVOSA, RESTRICTING TYPE, MILD: ICD-10-CM

## 2023-08-31 DIAGNOSIS — F41.1 GAD (GENERALIZED ANXIETY DISORDER): Primary | ICD-10-CM

## 2023-08-31 DIAGNOSIS — F33.0 MDD (MAJOR DEPRESSIVE DISORDER), RECURRENT EPISODE, MILD: ICD-10-CM

## 2023-08-31 PROCEDURE — 90834 PR PSYCHOTHERAPY W/PATIENT, 45 MIN: ICD-10-PCS | Mod: 95,,, | Performed by: SOCIAL WORKER

## 2023-08-31 PROCEDURE — 90834 PSYTX W PT 45 MINUTES: CPT | Mod: 95,,, | Performed by: SOCIAL WORKER

## 2023-08-31 NOTE — PROGRESS NOTES
"The patient location is:  [facility].  The patient location [place] is: ASHKAN Chavarria   The patient phone number is: [phone]  Visit type: Virtual visit with synchronous audio and video  Each patient to whom he or she provides medical services by telemedicine is:  (1) informed of the relationship between the physician and patient and the respective role of any other health care provider with respect to management of the patient; and (2) notified that he or she may decline to receive medical services by telemedicine and may withdraw from such care at any time.  Crisis Disclaimer: Patient was informed that due to the virtual nature of the visit, that if a crisis develops, protocols will be implemented to ensure patient safety, including but not limited to: 1) Initiating a welfare check with local Law Enforcement, 2) Calling 1/National Crisis Hotline, and/or 3) Initiating PEC/CEC procedures.    Individual Psychotherapy (PhD/LCSW)    08/31/2023    Interim Events/Subjective Report/Content of Current Session:  follow-up appointment.    Pt is a 28 y.o. female with past psychiatric hx of mdd, manoj, and anorexia who presents for follow-up treatment.  Pt has had the flu which has increased isolation. Depression reported to be increased currently.   Not feeling well physically. Recently had a birthday. Stated that this day was not helpful for depressive episode as siblings went to a concert she was not invited to. Increased feelings of rejection that she has felt in the past with family member.     Restricting diet currently after feeling like a picture from [place] made her "look bigger than usual"  Isolating, withdrawn from others, increased shame, decreased self worth. Pt denies any current SI/HI. Denies any current A/VH. Denied self harm.    Pt is aware of increased depression and working on identifying achievable goals. Reviewed coping skills based on desired emotions to assist with depression. "     Will continue to follow.   Pt aware to contact sw for any additional needs that may occur prior to next session.  Therapeutic Intervention/Techniques: behavior modification, insight oriented, interactive, and supportive; relevant to diagnosis, patient responds to this modality    Risk Parameters:  Patient reports no suicidal ideation  Patient reports no homicidal ideation  Patient reports no self-injurious behavior  Patient reports no violent behavior    Diagnosis:   1. NAZ (generalized anxiety disorder)        2. MDD (major depressive disorder), recurrent episode, mild        3. Anorexia nervosa, restricting type, mild            Return to Clinic: as scheduled  Counseling time: 45  -Call to report any worsening of symptoms or problems associated with medication.  - Pt instructed to go to ER if thoughts of harming self or others arise.   -Supportive therapy and psychoeducation provided  -Pt instructed to call clinic, 911 or go to nearest emergency room if sxs worsen or pt is in crisis. The pt expresses understanding.   Each patient to whom he or she provides medical services by telemedicine is:  (1) informed of the relationship between the physician and patient and the respective role of any other health care provider with respect to management of the patient; and (2) notified that he or she may decline to receive medical services by telemedicine and may withdraw from such care at any time.

## 2023-09-25 ENCOUNTER — OFFICE VISIT (OUTPATIENT)
Dept: PSYCHIATRY | Facility: CLINIC | Age: 28
End: 2023-09-25
Payer: COMMERCIAL

## 2023-09-25 DIAGNOSIS — F41.1 GAD (GENERALIZED ANXIETY DISORDER): Primary | ICD-10-CM

## 2023-09-25 DIAGNOSIS — F50.01 ANOREXIA NERVOSA, RESTRICTING TYPE, MILD: ICD-10-CM

## 2023-09-25 DIAGNOSIS — F33.0 MDD (MAJOR DEPRESSIVE DISORDER), RECURRENT EPISODE, MILD: ICD-10-CM

## 2023-09-25 PROCEDURE — 90834 PSYTX W PT 45 MINUTES: CPT | Mod: 95,,, | Performed by: SOCIAL WORKER

## 2023-09-25 PROCEDURE — 90834 PR PSYCHOTHERAPY W/PATIENT, 45 MIN: ICD-10-PCS | Mod: 95,,, | Performed by: SOCIAL WORKER

## 2023-09-25 NOTE — PROGRESS NOTES
"The patient location is:  [facility] living.  The patient location [place] is: ASHKAN Chavarria   The patient phone number is: [phone]  Visit type: Virtual visit with synchronous audio and video  Each patient to whom he or she provides medical services by telemedicine is:  (1) informed of the relationship between the physician and patient and the respective role of any other health care provider with respect to management of the patient; and (2) notified that he or she may decline to receive medical services by telemedicine and may withdraw from such care at any time.  Crisis Disclaimer: Patient was informed that due to the virtual nature of the visit, that if a crisis develops, protocols will be implemented to ensure patient safety, including but not limited to: 1) Initiating a welfare check with local Law Enforcement, 2) Calling 1/National Crisis Hotline, and/or 3) Initiating PEC/CEC procedures.    Individual Psychotherapy (PhD/LCSW)    09/25/2023    Interim Events/Subjective Report/Content of Current Session:  follow-up appointment.    Pt is a 28 y.o. female with past psychiatric hx of mdd, manoj, and anorexia who presents for follow-up treatment.  Pt stated she was able to be in a "better mood."   Continues to apply for jobs throughout the week.  Pt shows times of mood waxing and waning. Compulsions present wit job search. Anxiety present during session. Tendency to present as narcissistic. Thinks about herself with grandeur and high standard. Attributes this to her family status in her hometown and being child of someone in a political/ standpoint. Focused on feeling mood and thoughts shift into a more positive and solution focused manner. Continue to have depressive episodes. Times of withdrawal and isolation from other needed to "recharge". Negative thought processes continue to show present in pt life vs positive thinking when processing current life situation. Pt focused on having "a " "lot of pride." Discussed when pride is helpful vs. hindering .   After processing further, it was identified that pt feels a grief reaction the same a "a break up" when it comes to being laid off. "Wanting to approach life differently." Plan to have pt identify rings of relationships to process longing and feelings regarding friendships and relationships with family .    Will continue to follow.   Pt aware to contact  for any additional needs that may occur prior to next session.  Therapeutic Intervention/Techniques: behavior modification, insight oriented, interactive, and supportive; relevant to diagnosis, patient responds to this modality    Risk Parameters:  Patient reports no suicidal ideation  Patient reports no homicidal ideation  Patient reports no self-injurious behavior  Patient reports no violent behavior    Diagnosis:   .  1. NAZ (generalized anxiety disorder)        2. MDD (major depressive disorder), recurrent episode, mild        3. Anorexia nervosa, restricting type, mild                Return to Clinic: as scheduled  Counseling time: 45  -Call to report any worsening of symptoms or problems associated with medication.  - Pt instructed to go to ER if thoughts of harming self or others arise.   -Supportive therapy and psychoeducation provided  -Pt instructed to call clinic, 911 or go to nearest emergency room if sxs worsen or pt is in crisis. The pt expresses understanding.   Each patient to whom he or she provides medical services by telemedicine is:  (1) informed of the relationship between the physician and patient and the respective role of any other health care provider with respect to management of the patient; and (2) notified that he or she may decline to receive medical services by telemedicine and may withdraw from such care at any time.    "

## 2023-09-28 ENCOUNTER — PATIENT MESSAGE (OUTPATIENT)
Dept: PSYCHIATRY | Facility: CLINIC | Age: 28
End: 2023-09-28
Payer: COMMERCIAL

## 2023-09-28 ENCOUNTER — OFFICE VISIT (OUTPATIENT)
Dept: PSYCHIATRY | Facility: CLINIC | Age: 28
End: 2023-09-28
Payer: COMMERCIAL

## 2023-09-28 DIAGNOSIS — F33.0 MDD (MAJOR DEPRESSIVE DISORDER), RECURRENT EPISODE, MILD: ICD-10-CM

## 2023-09-28 DIAGNOSIS — F41.1 GAD (GENERALIZED ANXIETY DISORDER): Primary | ICD-10-CM

## 2023-09-28 DIAGNOSIS — F50.01 ANOREXIA NERVOSA, RESTRICTING TYPE, MILD: ICD-10-CM

## 2023-09-28 PROCEDURE — 90834 PR PSYCHOTHERAPY W/PATIENT, 45 MIN: ICD-10-PCS | Mod: 95,,, | Performed by: SOCIAL WORKER

## 2023-09-28 PROCEDURE — 90834 PSYTX W PT 45 MINUTES: CPT | Mod: 95,,, | Performed by: SOCIAL WORKER

## 2023-09-28 NOTE — PROGRESS NOTES
The patient location is:  [facility].  The patient location [place] is: ASHKAN Chavarria   The patient phone number is: [phone]  Visit type: Virtual visit with synchronous audio and video  Each patient to whom he or she provides medical services by telemedicine is:  (1) informed of the relationship between the physician and patient and the respective role of any other health care provider with respect to management of the patient; and (2) notified that he or she may decline to receive medical services by telemedicine and may withdraw from such care at any time.  Crisis Disclaimer: Patient was informed that due to the virtual nature of the visit, that if a crisis develops, protocols will be implemented to ensure patient safety, including but not limited to: 1) Initiating a welfare check with local Law Enforcement, 2) Calling 1/National Crisis Hotline, and/or 3) Initiating PEC/CEC procedures.    Individual Psychotherapy (PhD/LCSW)    09/28/2023    Interim Events/Subjective Report/Content of Current Session:  follow-up appointment.    Pt is a 28 y.o. female with past psychiatric hx of mdd, manoj, and anorexia who presents for follow-up treatment.    Pt stated her scheduled an appointment today after a friend completed suicide that she went to high school with.  Pt stated this is not someone close to her but instead a significant trigger to pt. Pt stated this caused anger and uncontrollable crying. Also caused thoughts of self harm. Did not complete self harm. Reviewed pt warning signs of severe depression to assist in validating that pt is aware of how to care for herself. Discussed additional ways to care for mental health at this time. Pt identified working through finding meaning of grief process pertaining to current life status not being where it needs to be.  Pt to increase self care and lean on support system today.   Mood is congruent to situation. Pt denies any current SI/HI. Denies any current  A/VH.   Will continue to follow.   Pt aware to contact sw for any additional needs that may occur prior to next session.  Therapeutic Intervention/Techniques: behavior modification, insight oriented, interactive, and supportive; relevant to diagnosis, patient responds to this modality    Risk Parameters:  Patient reports no suicidal ideation  Patient reports no homicidal ideation  Patient reports no self-injurious behavior  Patient reports no violent behavior    Diagnosis:     1. NAZ (generalized anxiety disorder)        2. MDD (major depressive disorder), recurrent episode, mild        3. Anorexia nervosa, restricting type, mild                  Return to Clinic: as scheduled  Counseling time: 45  -Call to report any worsening of symptoms or problems associated with medication.  - Pt instructed to go to ER if thoughts of harming self or others arise.   -Supportive therapy and psychoeducation provided  -Pt instructed to call clinic, 911 or go to nearest emergency room if sxs worsen or pt is in crisis. The pt expresses understanding.   Each patient to whom he or she provides medical services by telemedicine is:  (1) informed of the relationship between the physician and patient and the respective role of any other health care provider with respect to management of the patient; and (2) notified that he or she may decline to receive medical services by telemedicine and may withdraw from such care at any time.

## 2023-10-09 ENCOUNTER — OFFICE VISIT (OUTPATIENT)
Dept: PSYCHIATRY | Facility: CLINIC | Age: 28
End: 2023-10-09
Payer: COMMERCIAL

## 2023-10-09 ENCOUNTER — PATIENT MESSAGE (OUTPATIENT)
Dept: PSYCHIATRY | Facility: CLINIC | Age: 28
End: 2023-10-09
Payer: COMMERCIAL

## 2023-10-09 DIAGNOSIS — F33.0 MDD (MAJOR DEPRESSIVE DISORDER), RECURRENT EPISODE, MILD: ICD-10-CM

## 2023-10-09 DIAGNOSIS — F41.1 GAD (GENERALIZED ANXIETY DISORDER): Primary | ICD-10-CM

## 2023-10-09 DIAGNOSIS — F50.01 ANOREXIA NERVOSA, RESTRICTING TYPE, MILD: ICD-10-CM

## 2023-10-09 PROCEDURE — 90832 PSYTX W PT 30 MINUTES: CPT | Mod: 95,,, | Performed by: SOCIAL WORKER

## 2023-10-09 PROCEDURE — 90832 PR PSYCHOTHERAPY W/PATIENT, 30 MIN: ICD-10-PCS | Mod: 95,,, | Performed by: SOCIAL WORKER

## 2023-10-09 NOTE — PROGRESS NOTES
The patient location is:  [facility].  The patient location [place] is: ASHKAN Chavarria   The patient phone number is: [phone]  Visit type: Virtual visit with synchronous audio and video  Each patient to whom he or she provides medical services by telemedicine is:  (1) informed of the relationship between the physician and patient and the respective role of any other health care provider with respect to management of the patient; and (2) notified that he or she may decline to receive medical services by telemedicine and may withdraw from such care at any time.  Crisis Disclaimer: Patient was informed that due to the virtual nature of the visit, that if a crisis develops, protocols will be implemented to ensure patient safety, including but not limited to: 1) Initiating a welfare check with local Law Enforcement, 2) Calling [employer]/National Crisis Hotline, and/or 3) Initiating PEC/CEC procedures.    Individual Psychotherapy (PhD/LCSW)    10/09/2023    Interim Events/Subjective Report/Content of Current Session:  follow-up appointment.    Pt is a 28 y.o. female with past psychiatric hx of mdd, manoj, and anorexia who presents for follow-up treatment.  Pt stated she has moments when with her parents and /or family that she is triggered and creates automatic thoughts and emotions related to depression. Feeling that when this occurred she was unable to identify precipitating factor. After assessing in session was able to identify a triggering conversation that was unconscious to pt at the time as triggering.     Focused on needing to get out the routine pt has been in to increase mood. Brainstormed ideas for self care. Discussed how coping skills have changed over this year as well as support sytem. Pt has an interview in [place], [place]. Pt dsicussed grief reaction for grandmother who has declined with dementia. Identified wanting to consider a group to add support. Resource provided.   Mood was goal directed,  mild grief reaction present. Longing for supportive relationships.    Will continue to follow.   Pt aware to contact sw for any additional needs that may occur prior to next session.  Therapeutic Intervention/Techniques: behavior modification, insight oriented, interactive, and supportive; relevant to diagnosis, patient responds to this modality    Risk Parameters:  Patient reports no suicidal ideation  Patient reports no homicidal ideation  Patient reports no self-injurious behavior  Patient reports no violent behavior    Diagnosis:     1. NAZ (generalized anxiety disorder)        2. MDD (major depressive disorder), recurrent episode, mild        3. Anorexia nervosa, restricting type, mild                    Return to Clinic: as scheduled  Counseling time: 45  -Call to report any worsening of symptoms or problems associated with medication.  - Pt instructed to go to ER if thoughts of harming self or others arise.   -Supportive therapy and psychoeducation provided  -Pt instructed to call clinic, 911 or go to nearest emergency room if sxs worsen or pt is in crisis. The pt expresses understanding.   Each patient to whom he or she provides medical services by telemedicine is:  (1) informed of the relationship between the physician and patient and the respective role of any other health care provider with respect to management of the patient; and (2) notified that he or she may decline to receive medical services by telemedicine and may withdraw from such care at any time.

## 2023-10-20 ENCOUNTER — OFFICE VISIT (OUTPATIENT)
Dept: INTERNAL MEDICINE | Facility: CLINIC | Age: 28
End: 2023-10-20
Payer: COMMERCIAL

## 2023-10-20 ENCOUNTER — HOSPITAL ENCOUNTER (OUTPATIENT)
Dept: RADIOLOGY | Facility: HOSPITAL | Age: 28
Discharge: HOME | End: 2023-10-20
Payer: COMMERCIAL

## 2023-10-20 VITALS
WEIGHT: 129.63 LBS | BODY MASS INDEX: 20.83 KG/M2 | TEMPERATURE: 98 F | RESPIRATION RATE: 18 BRPM | SYSTOLIC BLOOD PRESSURE: 116 MMHG | DIASTOLIC BLOOD PRESSURE: 72 MMHG | HEIGHT: 66 IN | HEART RATE: 98 BPM | OXYGEN SATURATION: 98 %

## 2023-10-20 DIAGNOSIS — M54.9 DORSALGIA: ICD-10-CM

## 2023-10-20 DIAGNOSIS — M54.9 DORSALGIA: Primary | ICD-10-CM

## 2023-10-20 PROCEDURE — 72070 X-RAY EXAM THORAC SPINE 2VWS: CPT | Mod: 26,,, | Performed by: RADIOLOGY

## 2023-10-20 PROCEDURE — 72040 X-RAY EXAM NECK SPINE 2-3 VW: CPT | Mod: 26,,, | Performed by: RADIOLOGY

## 2023-10-20 PROCEDURE — 72040 X-RAY EXAM NECK SPINE 2-3 VW: CPT | Mod: TC

## 2023-10-20 PROCEDURE — 99999 PR PBB SHADOW E&M-EST. PATIENT-LVL V: ICD-10-PCS | Mod: PBBFAC,,,

## 2023-10-20 PROCEDURE — 99214 PR OFFICE/OUTPT VISIT, EST, LEVL IV, 30-39 MIN: ICD-10-PCS | Mod: S$GLB,,,

## 2023-10-20 PROCEDURE — 99214 OFFICE O/P EST MOD 30 MIN: CPT | Mod: S$GLB,,,

## 2023-10-20 PROCEDURE — 72100 XR LUMBAR SPINE AP AND LATERAL: ICD-10-PCS | Mod: 26,,, | Performed by: RADIOLOGY

## 2023-10-20 PROCEDURE — 72100 X-RAY EXAM L-S SPINE 2/3 VWS: CPT | Mod: 26,,, | Performed by: RADIOLOGY

## 2023-10-20 PROCEDURE — 99999 PR PBB SHADOW E&M-EST. PATIENT-LVL V: CPT | Mod: PBBFAC,,,

## 2023-10-20 PROCEDURE — 72070 XR THORACIC SPINE AP LATERAL: ICD-10-PCS | Mod: 26,,, | Performed by: RADIOLOGY

## 2023-10-20 PROCEDURE — 72100 X-RAY EXAM L-S SPINE 2/3 VWS: CPT | Mod: TC

## 2023-10-20 PROCEDURE — 72070 X-RAY EXAM THORAC SPINE 2VWS: CPT | Mod: TC

## 2023-10-20 PROCEDURE — 72040 XR CERVICAL SPINE AP LATERAL: ICD-10-PCS | Mod: 26,,, | Performed by: RADIOLOGY

## 2023-10-20 RX ORDER — METFORMIN HYDROCHLORIDE 500 MG/1
500 TABLET ORAL DAILY
COMMUNITY
Start: 2023-10-16

## 2023-10-20 RX ORDER — TIZANIDINE 2 MG/1
4 TABLET ORAL EVERY 6 HOURS PRN
Qty: 30 TABLET | Refills: 1 | Status: SHIPPED | OUTPATIENT
Start: 2023-10-20 | End: 2023-10-23

## 2023-10-20 RX ORDER — DICLOFENAC SODIUM 50 MG/1
50 TABLET, DELAYED RELEASE ORAL 2 TIMES DAILY
Qty: 60 TABLET | Refills: 1 | Status: SHIPPED | OUTPATIENT
Start: 2023-10-20 | End: 2023-10-23

## 2023-10-20 RX ORDER — DROSPIRENONE, ETHINYL ESTRADIOL AND LEVOMEFOLATE CALCIUM AND LEVOMEFOLATE CALCIUM 3-0.02(24)
1 KIT ORAL DAILY
COMMUNITY
Start: 2023-10-16

## 2023-10-20 NOTE — PROGRESS NOTES
"Lisa Patterson  10/20/2023  [id number]    Amanda Cruz MD  Patient Care Team:  Amanda Cruz MD as PCP - General (Family Medicine)  Jo Michelle MD as Obstetrician (Obstetrics and Gynecology)          Visit Type:an urgent visit for a new problem    Chief Complaint:  Chief Complaint   Patient presents with    Low-back Pain        History of Present Illness:    The patient presents today for lumbar pain . The pain started a few days ago. The patient describes the pain as "restriction and intense". They have tried OTC meds without relief. The pain  radiates down the at times legs. At times the pain radiates towards the neck The patient denies bowel or bladder dysfunction. The pain denies any recent falls, injuries, or accidents to cause the pain. The patient does not lift on heavy objects.      Last few days pain with having a BM     Pain in back while laying down  Recently changed pillows  Did have surgery on while during childhood     History:  Past Medical History:   Diagnosis Date    Anorexia     Anxiety     MDD (major depressive disorder), recurrent episode, mild      No past surgical history on file.  Family History   Problem Relation Age of Onset    Thyroid disease Mother     Cataracts Mother     Breast cancer Maternal Grandfather      Social History     Socioeconomic History    Marital status: Single   Tobacco Use    Smoking status: Never     Passive exposure: Never    Smokeless tobacco: Never   Substance and Sexual Activity    Alcohol use: Not Currently     Comment: socailly    Drug use: Never    Sexual activity: Yes     Partners: Male     Patient Active Problem List   Diagnosis    Anxiety    Back pain    Myalgia    Irregular periods/menstrual cycles     Review of patient's allergies indicates:  No Known Allergies    The following were reviewed at this visit: active problem list, medication list, allergies, family history, social history, and health maintenance.    Medications:  Current " Outpatient Medications on File Prior to Visit   Medication Sig Dispense Refill    LORazepam (ATIVAN) 1 MG tablet Take 1/2 to 1 tablet twice daily as needed. 60 tablet 1    metroNIDAZOLE 0.75 % Lotn Apply topically 2 (two) times daily.      sertraline (ZOLOFT) 100 MG tablet Take 1 tablet (100 mg total) by mouth once daily. 30 tablet 2     No current facility-administered medications on file prior to visit.       Medications have been reviewed and reconciled with patient at this visit.  Barriers to medications reviewed with patient.    Adverse reactions to current medications reviewed with patient..    Over the counter medications reviewed and reconciled with patient.    Exam:  Wt Readings from Last 3 Encounters:   10/27/22 53.5 kg (117 lb 15.1 oz)   10/14/22 54.4 kg (120 lb)   03/28/22 53.2 kg (117 lb 4.6 oz)     Temp Readings from Last 3 Encounters:   02/02/22 97.2 °F (36.2 °C) (Tympanic)   02/01/22 97.7 °F (36.5 °C) (Temporal)   12/16/21 97 °F (36.1 °C) (Tympanic)     BP Readings from Last 3 Encounters:   10/27/22 102/66   10/14/22 112/70   03/28/22 120/68     Pulse Readings from Last 3 Encounters:   10/27/22 67   02/02/22 106   02/01/22 99     There is no height or weight on file to calculate BMI.      Review of Systems   Constitutional:  Negative for fever and weight loss.   Gastrointestinal:  Negative for abdominal pain.   Genitourinary:  Negative for dysuria and hematuria.   Musculoskeletal:  Positive for back pain.   Neurological:  Positive for weakness and headaches. Negative for tingling.     Physical Exam  Vitals and nursing note reviewed.   Constitutional:       General: She is not in acute distress.     Appearance: She is well-developed. She is not diaphoretic.   HENT:      Head: Normocephalic and atraumatic.      Right Ear: External ear normal.      Left Ear: External ear normal.   Eyes:      General:         Right eye: No discharge.         Left eye: No discharge.      Conjunctiva/sclera: Conjunctivae  "normal.      Pupils: Pupils are equal, round, and reactive to light.   Cardiovascular:      Rate and Rhythm: Normal rate and regular rhythm.      Heart sounds: Normal heart sounds. No murmur heard.  Pulmonary:      Effort: Pulmonary effort is normal. No respiratory distress.      Breath sounds: Normal breath sounds. No wheezing.   Musculoskeletal:         General: Tenderness present.      Cervical back: Pain with movement present.      Thoracic back: Tenderness present.      Lumbar back: Tenderness present.        Back:    Neurological:      Mental Status: She is alert and oriented to person, place, and time.   Psychiatric:         Behavior: Behavior normal.         Thought Content: Thought content normal.         Judgment: Judgment normal.         Laboratory Reviewed ({N/A)  No results found for: "WBC", "HGB", "HCT", "PLT", "CHOL", "TRIG", "HDL", "LDLDIRECT", "ALT", "AST", "NA", "K", "CL", "CREATININE", "BUN", "CO2", "TSH", "PSA", "INR", "GLUF", "HGBA1C", "MICROALBUR"    Lisa was seen today for low-back pain.    Diagnoses and all orders for this visit:    Dorsalgia  -     Cancel: X-Ray Cervical Spine Complete 5 view; Future  -     Cancel: X-Ray Lumbar Spine 5 View; Future  -     tiZANidine (ZANAFLEX) 2 MG tablet; Take 2 tablets (4 mg total) by mouth every 6 (six) hours as needed (muscle spams).  -     diclofenac (VOLTAREN) 50 MG EC tablet; Take 1 tablet (50 mg total) by mouth 2 (two) times daily.  -     Cancel: X-Ray Thoracic Spine 4 or more views; Future  -     X-Ray Cervical Spine AP And Lateral; Future  -     X-Ray Thoracic Spine AP Lateral; Future  -     X-Ray Lumbar Spine AP And Lateral; Future      Discussed going on Youtube to do stretches   Discussed using OTC pain and lidocaine patches and Soaking in warm water water with epsom salt      May benefit from PT if no improvement       Care Plan/Goals: Reviewed    Goals    None         Follow up: No follow-ups on file.    After visit summary was printed and " given to patient upon discharge today.  Patient goals and care plan are included in After Visit Summary.      Answers submitted by the patient for this visit:  Back Pain Questionnaire (Submitted on 10/20/2023)  Chief Complaint: Back pain  Chronicity: recurrent  Onset: in the past 7 days  Frequency: constantly  Progression since onset: waxing and waning  Pain location: sacro-iliac  Pain quality: aching, stabbing  Radiates to: right thigh  Pain - numeric: 8/10  Pain is: the same all the time  Aggravated by: bending, position, sitting  Stiffness is present: all day  bladder incontinence: No  bowel incontinence: No  leg pain: Yes  numbness: Yes  paresis: No  paresthesias: No  pelvic pain: Yes  perianal numbness: No  genital pain: No  Pain severity: severe  Improvement on treatment: moderate

## 2023-10-22 ENCOUNTER — PATIENT MESSAGE (OUTPATIENT)
Dept: INTERNAL MEDICINE | Facility: CLINIC | Age: 28
End: 2023-10-22
Payer: COMMERCIAL

## 2023-10-23 ENCOUNTER — PATIENT MESSAGE (OUTPATIENT)
Dept: INTERNAL MEDICINE | Facility: CLINIC | Age: 28
End: 2023-10-23
Payer: COMMERCIAL

## 2023-10-23 DIAGNOSIS — M54.9 DORSALGIA: ICD-10-CM

## 2023-10-23 DIAGNOSIS — R11.0 NAUSEA: Primary | ICD-10-CM

## 2023-10-23 RX ORDER — CYCLOBENZAPRINE HCL 5 MG
5 TABLET ORAL 3 TIMES DAILY PRN
Qty: 30 TABLET | Refills: 0 | Status: SHIPPED | OUTPATIENT
Start: 2023-10-23

## 2023-10-23 RX ORDER — ONDANSETRON 4 MG/1
4 TABLET, FILM COATED ORAL EVERY 6 HOURS PRN
Qty: 20 TABLET | Refills: 0 | Status: SHIPPED | OUTPATIENT
Start: 2023-10-23

## 2023-10-24 ENCOUNTER — PATIENT MESSAGE (OUTPATIENT)
Dept: PSYCHIATRY | Facility: CLINIC | Age: 28
End: 2023-10-24
Payer: COMMERCIAL

## 2023-10-29 ENCOUNTER — PATIENT MESSAGE (OUTPATIENT)
Dept: PSYCHIATRY | Facility: CLINIC | Age: 28
End: 2023-10-29
Payer: COMMERCIAL

## 2023-10-30 ENCOUNTER — DOCUMENTATION ONLY (OUTPATIENT)
Dept: PSYCHIATRY | Facility: CLINIC | Age: 28
End: 2023-10-30
Payer: COMMERCIAL

## 2023-11-15 ENCOUNTER — PATIENT MESSAGE (OUTPATIENT)
Dept: PSYCHIATRY | Facility: CLINIC | Age: 28
End: 2023-11-15
Payer: COMMERCIAL

## 2023-11-17 ENCOUNTER — OFFICE VISIT (OUTPATIENT)
Dept: PSYCHIATRY | Facility: CLINIC | Age: 28
End: 2023-11-17
Payer: COMMERCIAL

## 2023-11-17 DIAGNOSIS — F50.01 ANOREXIA NERVOSA, RESTRICTING TYPE, MILD: ICD-10-CM

## 2023-11-17 DIAGNOSIS — F33.0 MDD (MAJOR DEPRESSIVE DISORDER), RECURRENT EPISODE, MILD: ICD-10-CM

## 2023-11-17 DIAGNOSIS — F41.1 GAD (GENERALIZED ANXIETY DISORDER): Primary | ICD-10-CM

## 2023-11-17 PROCEDURE — 90834 PR PSYCHOTHERAPY W/PATIENT, 45 MIN: ICD-10-PCS | Mod: 95,,, | Performed by: SOCIAL WORKER

## 2023-11-17 PROCEDURE — 90834 PSYTX W PT 45 MINUTES: CPT | Mod: 95,,, | Performed by: SOCIAL WORKER

## 2023-11-17 NOTE — PROGRESS NOTES
"  The patient location is:  [facility].  The patient location [place] is: ASHKAN Chavarria   The patient phone number is: [phone]  Visit type: Virtual visit with synchronous audio and video  Each patient to whom he or she provides medical services by telemedicine is:  (1) informed of the relationship between the physician and patient and the respective role of any other health care provider with respect to management of the patient; and (2) notified that he or she may decline to receive medical services by telemedicine and may withdraw from such care at any time.  Crisis Disclaimer: Patient was informed that due to the virtual nature of the visit, that if a crisis develops, protocols will be implemented to ensure patient safety, including but not limited to: 1) Initiating a welfare check with local Law Enforcement, 2) Calling 1/National Crisis Hotline, and/or 3) Initiating PEC/CEC procedures.    Individual Psychotherapy (PhD/LCSW)    11/17/2023    Interim Events/Subjective Report/Content of Current Session:  follow-up appointment.    Pt is a 28 y.o. female with past psychiatric hx of mdd, manoj, and anorexia who presents for follow-up treatment.  Pt stated she does not feel like she has had her situation change. Feeling that she is focused on moving forward. Hopefulness present in pt. Indicated she did not schedule sessions "because I felt like I was saying the same thing." Pt and sw discussed this thought. Focused on comparison of others and now focusing giving herself cooperation and validation. Plan currently is to continue to provide support with holiday season.     Mood congruent to situation. Motivation present depression and anxiety decreased.     Will continue to follow.   Pt aware to contact sw for any additional needs that may occur prior to next session.  Therapeutic Intervention/Techniques: behavior modification, insight oriented, interactive, and supportive; relevant to diagnosis, " patient responds to this modality    Risk Parameters:  Patient reports no suicidal ideation  Patient reports no homicidal ideation  Patient reports no self-injurious behavior  Patient reports no violent behavior    Diagnosis:     1. NAZ (generalized anxiety disorder)        2. MDD (major depressive disorder), recurrent episode, mild        3. Anorexia nervosa, restricting type, mild                      Return to Clinic: as scheduled  Counseling time: 45  -Call to report any worsening of symptoms or problems associated with medication.  - Pt instructed to go to ER if thoughts of harming self or others arise.   -Supportive therapy and psychoeducation provided  -Pt instructed to call clinic, 911 or go to nearest emergency room if sxs worsen or pt is in crisis. The pt expresses understanding.   Each patient to whom he or she provides medical services by telemedicine is:  (1) informed of the relationship between the physician and patient and the respective role of any other health care provider with respect to management of the patient; and (2) notified that he or she may decline to receive medical services by telemedicine and may withdraw from such care at any time.

## 2023-12-04 RX ORDER — SERTRALINE HYDROCHLORIDE 100 MG/1
100 TABLET, FILM COATED ORAL
Qty: 30 TABLET | Refills: 2 | Status: SHIPPED | OUTPATIENT
Start: 2023-12-04 | End: 2024-02-05 | Stop reason: SDUPTHER

## 2023-12-13 ENCOUNTER — OFFICE VISIT (OUTPATIENT)
Dept: PSYCHIATRY | Facility: CLINIC | Age: 28
End: 2023-12-13
Payer: COMMERCIAL

## 2023-12-13 DIAGNOSIS — F33.0 MDD (MAJOR DEPRESSIVE DISORDER), RECURRENT EPISODE, MILD: ICD-10-CM

## 2023-12-13 DIAGNOSIS — F50.01 ANOREXIA NERVOSA, RESTRICTING TYPE, MILD: ICD-10-CM

## 2023-12-13 DIAGNOSIS — F41.1 GAD (GENERALIZED ANXIETY DISORDER): Primary | ICD-10-CM

## 2023-12-13 PROCEDURE — 90832 PR PSYCHOTHERAPY W/PATIENT, 30 MIN: ICD-10-PCS | Mod: 95,,, | Performed by: SOCIAL WORKER

## 2023-12-13 PROCEDURE — 90832 PSYTX W PT 30 MINUTES: CPT | Mod: 95,,, | Performed by: SOCIAL WORKER

## 2023-12-13 NOTE — PROGRESS NOTES
The patient location is:  [facility].  The patient location [place] is: ASHKAN Chavarria   The patient phone number is: [phone]  Visit type: Virtual visit with synchronous audio and video  Each patient to whom he or she provides medical services by telemedicine is:  (1) informed of the relationship between the physician and patient and the respective role of any other health care provider with respect to management of the patient; and (2) notified that he or she may decline to receive medical services by telemedicine and may withdraw from such care at any time.  Crisis Disclaimer: Patient was informed that due to the virtual nature of the visit, that if a crisis develops, protocols will be implemented to ensure patient safety, including but not limited to: 1) Initiating a welfare check with local Law Enforcement, 2) Calling 1/National Crisis Hotline, and/or 3) Initiating PEC/CEC procedures.    Individual Psychotherapy (PhD/LCSW)    12/13/2023    Interim Events/Subjective Report/Content of Current Session:  follow-up appointment.    Pt is a 28 y.o. female with past psychiatric hx of mdd, manoj, and anorexia who presents for follow-up treatment.  Pt stated she is moving to [place], [place].   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 Decided she continues to not be happy where she lives. Has wanted to move for multiple years. Has worked through thoughts and emotions regarding mood. Appears to show awareness of responsibility as well as urge to live on her own again.   Pt will have a roommate. This roommate has been someone she has identified having a different personality than pt. Working on communication with roommate.     Mood congruent to situation. Motivation present depression and anxiety decreased.     Will continue to follow. Pt to make appointments when she is in the state for dr. Multani or visiting parents/family.  Pt aware to contact  for any additional  needs that may occur prior to next session.  Therapeutic Intervention/Techniques: behavior modification, insight oriented, interactive, and supportive; relevant to diagnosis, patient responds to this modality    Risk Parameters:  Patient reports no suicidal ideation  Patient reports no homicidal ideation  Patient reports no self-injurious behavior  Patient reports no violent behavior    Diagnosis:     1. NAZ (generalized anxiety disorder)        2. MDD (major depressive disorder), recurrent episode, mild        3. Anorexia nervosa, restricting type, mild              Return to Clinic: as scheduled  Counseling time: 45  -Call to report any worsening of symptoms or problems associated with medication.  - Pt instructed to go to ER if thoughts of harming self or others arise.   -Supportive therapy and psychoeducation provided  -Pt instructed to call clinic, 911 or go to nearest emergency room if sxs worsen or pt is in crisis. The pt expresses understanding.   Each patient to whom he or she provides medical services by telemedicine is:  (1) informed of the relationship between the physician and patient and the respective role of any other health care provider with respect to management of the patient; and (2) notified that he or she may decline to receive medical services by telemedicine and may withdraw from such care at any time.

## 2024-01-25 ENCOUNTER — PATIENT MESSAGE (OUTPATIENT)
Dept: PSYCHIATRY | Facility: CLINIC | Age: 29
End: 2024-01-25
Payer: COMMERCIAL

## 2024-02-02 ENCOUNTER — TELEPHONE (OUTPATIENT)
Dept: NEUROSURGERY | Facility: CLINIC | Age: 29
End: 2024-02-02
Payer: COMMERCIAL

## 2024-02-02 NOTE — TELEPHONE ENCOUNTER
LVM. Advised pt that provider needs more advanced imaging to makle a complete plan of care. R/s appt with NP for same day. Advised pt to c/b if appt does not work for her.

## 2024-02-05 ENCOUNTER — PATIENT MESSAGE (OUTPATIENT)
Dept: PSYCHIATRY | Facility: CLINIC | Age: 29
End: 2024-02-05
Payer: COMMERCIAL

## 2024-02-05 RX ORDER — SERTRALINE HYDROCHLORIDE 100 MG/1
100 TABLET, FILM COATED ORAL DAILY
Qty: 30 TABLET | Refills: 2 | Status: SHIPPED | OUTPATIENT
Start: 2024-02-05

## 2024-02-05 RX ORDER — LORAZEPAM 1 MG/1
TABLET ORAL
Qty: 60 TABLET | Refills: 2 | Status: SHIPPED | OUTPATIENT
Start: 2024-02-05

## 2024-03-18 ENCOUNTER — OFFICE VISIT (OUTPATIENT)
Dept: PSYCHIATRY | Facility: CLINIC | Age: 29
End: 2024-03-18
Payer: COMMERCIAL

## 2024-03-18 DIAGNOSIS — F33.0 MDD (MAJOR DEPRESSIVE DISORDER), RECURRENT EPISODE, MILD: ICD-10-CM

## 2024-03-18 DIAGNOSIS — F41.1 GAD (GENERALIZED ANXIETY DISORDER): Primary | ICD-10-CM

## 2024-03-18 PROCEDURE — 90834 PSYTX W PT 45 MINUTES: CPT | Mod: 95,,, | Performed by: SOCIAL WORKER

## 2024-03-18 NOTE — PROGRESS NOTES
The patient location is:  [facility].  The patient location [place] is: ASHKAN [place]   The patient phone number is: [phone]  Visit type: Virtual visit with synchronous audio and video  Each patient to whom he or she provides medical services by telemedicine is:  (1) informed of the relationship between the physician and patient and the respective role of any other health care provider with respect to management of the patient; and (2) notified that he or she may decline to receive medical services by telemedicine and may withdraw from such care at any time.  Crisis Disclaimer: Patient was informed that due to the virtual nature of the visit, that if a crisis develops, protocols will be implemented to ensure patient safety, including but not limited to: 1) Initiating a welfare check with local Law Enforcement, 2) Calling 1/National Crisis Hotline, and/or 3) Initiating PEC/CEC procedures.    Individual Psychotherapy (PhD/LCSW)    03/18/2024    Interim Events/Subjective Report/Content of Current Session:  follow-up appointment.    Pt is a 28 y.o. female with past psychiatric hx of mdd, manoj, and anorexia who presents for follow-up treatment.  Pt stated she has been doing well since moving to [place]. Indicated she is back in [place] for a few days.   Identfied focusing goals with career and relationships being priority.   Finding that dating is something that she has noticed emotional interruptions at times. Feeling that she is taking things personally as well. Discussed patterns that have been created. Pt and sw spoke about leaning on support system for additional insight. Discussed dating more and focusing on ways  Pt also discussed her grandmother declining with Alzheimer's disease. Anticipatory grief reaction currently experienced. Provided support and resources to increase awareness of disease process.     Mood congruent to situation. Motivation present depression and anxiety decreased.      Will continue to follow. Pt to continue to make appointments when she is in the state for dr. Multani or visiting parents/family.  Pt aware to contact sw for any additional needs that may occur prior to next session.  Therapeutic Intervention/Techniques: behavior modification, insight oriented, interactive, and supportive; relevant to diagnosis, patient responds to this modality    Risk Parameters:  Patient reports no suicidal ideation  Patient reports no homicidal ideation  Patient reports no self-injurious behavior  Patient reports no violent behavior    Diagnosis:     1. NAZ (generalized anxiety disorder)        2. MDD (major depressive disorder), recurrent episode, mild                Return to Clinic: as scheduled  Counseling time: 45  -Call to report any worsening of symptoms or problems associated with medication.  - Pt instructed to go to ER if thoughts of harming self or others arise.   -Supportive therapy and psychoeducation provided  -Pt instructed to call clinic, 911 or go to nearest emergency room if sxs worsen or pt is in crisis. The pt expresses understanding.   Each patient to whom he or she provides medical services by telemedicine is:  (1) informed of the relationship between the physician and patient and the respective role of any other health care provider with respect to management of the patient; and (2) notified that he or she may decline to receive medical services by telemedicine and may withdraw from such care at any time.

## 2024-05-02 ENCOUNTER — PATIENT MESSAGE (OUTPATIENT)
Dept: PSYCHIATRY | Facility: CLINIC | Age: 29
End: 2024-05-02
Payer: COMMERCIAL

## 2025-01-06 ENCOUNTER — OFFICE VISIT (OUTPATIENT)
Dept: PSYCHIATRY | Facility: CLINIC | Age: 30
End: 2025-01-06

## 2025-01-06 DIAGNOSIS — F33.41 MDD (MAJOR DEPRESSIVE DISORDER), RECURRENT, IN PARTIAL REMISSION: ICD-10-CM

## 2025-01-06 DIAGNOSIS — F41.1 GAD (GENERALIZED ANXIETY DISORDER): Primary | ICD-10-CM

## 2025-01-06 DIAGNOSIS — Z86.59 HISTORY OF EATING DISORDER: ICD-10-CM

## 2025-01-06 NOTE — PROGRESS NOTES
The patient location is:  [facility].  The patient location [place] is: ASHKAN Chavar[place]   The patient phone number is: [phone]  Visit type: Virtual visit with synchronous audio and video  Each patient to whom he or she provides medical services by telemedicine is:  (1) informed of the relationship between the physician and patient and the respective role of any other health care provider with respect to management of the patient; and (2) notified that he or she may decline to receive medical services by telemedicine and may withdraw from such care at any time.  Crisis Disclaimer: Patient was informed that due to the virtual nature of the visit, that if a crisis develops, protocols will be implemented to ensure patient safety, including but not limited to: 1) Initiating a welfare check with local Law Enforcement, 2) Calling 1/National Crisis Hotline, and/or 3) Initiating PEC/CEC procedures.    Individual Psychotherapy (PhD/LCSW)    01/06/2025    Interim Events/Subjective Report/Content of Current Session:  follow-up appointment.    Pt is a 29 y.o. female with past psychiatric hx of mdd, manoj, and anorexia who presents for follow-up treatment.  Pt stated she has been doing well since moving to [place]. Indicated she is back in [place] for a few days and is now moving to [place], [place] working for [employer]. Pt identified this goal many years ago and has pursued this consistently.  Stated she has enjoyed the year of moving away. [place] a lot about herself per pt report. Provided encouragement for pt to continue to pursue these goals.   Reported she is continuing to date but not pursuing long term relationships.   Mood congruent to situation. Motivation present depression and anxiety decreased. Progress made with boundaries and self determination.    Will continue to follow. Pt to continue to make appointments when she is in the state for dr. Multani or visiting parents/family.    Pt aware to  contact sw for any additional needs that may occur prior to next session.  Therapeutic Intervention/Techniques: behavior modification, insight oriented, interactive, and supportive; relevant to diagnosis, patient responds to this modality    Risk Parameters:  Patient reports no suicidal ideation  Patient reports no homicidal ideation  Patient reports no self-injurious behavior  Patient reports no violent behavior    Diagnosis:     1. NAZ (generalized anxiety disorder)        2. MDD (major depressive disorder), recurrent, in partial remission        3. History of eating disorder              Return to Clinic: as scheduled  Counseling time: 45  -Call to report any worsening of symptoms or problems associated with medication.  - Pt instructed to go to ER if thoughts of harming self or others arise.   -Supportive therapy and psychoeducation provided  -Pt instructed to call clinic, 911 or go to nearest emergency room if sxs worsen or pt is in crisis. The pt expresses understanding.   Each patient to whom he or she provides medical services by telemedicine is:  (1) informed of the relationship between the physician and patient and the respective role of any other health care provider with respect to management of the patient; and (2) notified that he or she may decline to receive medical services by telemedicine and may withdraw from such care at any time.

## 2025-01-18 NOTE — PROGRESS NOTES
HPI    Np to Dr. MOMIN  Pt states she is having dry eye just right eye. Pt can't see anything out   the left eye . She states she don't wear the lens for the left .  When was your last eye exam was in 2020  Update contact RX and Glasses   Shira contact daily wear   Last edited by Farhad Whiting, OD on 4/18/2023 10:46 AM.            Assessment /Plan     For exam results, see Encounter Report.    Keratitis, right    Amblyopia of left eye    Encounter for fitting or adjustment of spectacles or contact lenses    Refractive errors      Keratitis OD from CL overwear. Painful after removal of lens.    Moderate astigmatism OS possible cause of amblyopia OS    Discussed CL charges.  Dispense Final Rx for glasses  Note changes CL Rx, will switch from Shira to Acuvue Oasys  RTC 1 year  Discussed above and answered questions.                      None